# Patient Record
Sex: MALE | Race: WHITE
[De-identification: names, ages, dates, MRNs, and addresses within clinical notes are randomized per-mention and may not be internally consistent; named-entity substitution may affect disease eponyms.]

---

## 2018-03-13 ENCOUNTER — HOSPITAL ENCOUNTER (OUTPATIENT)
Dept: HOSPITAL 74 - JASU-SURG | Age: 78
Discharge: HOME | End: 2018-03-13
Payer: COMMERCIAL

## 2018-03-13 VITALS — HEART RATE: 63 BPM | SYSTOLIC BLOOD PRESSURE: 124 MMHG | DIASTOLIC BLOOD PRESSURE: 73 MMHG

## 2018-03-13 VITALS — TEMPERATURE: 97.8 F

## 2018-03-13 VITALS — BODY MASS INDEX: 31.3 KG/M2

## 2018-03-13 DIAGNOSIS — H26.9: Primary | ICD-10-CM

## 2018-03-13 PROCEDURE — 08RJ3JZ REPLACEMENT OF RIGHT LENS WITH SYNTHETIC SUBSTITUTE, PERCUTANEOUS APPROACH: ICD-10-PCS

## 2018-03-13 RX ADMIN — TROPICAMIDE SCH DROP: 10 SOLUTION/ DROPS OPHTHALMIC at 09:12

## 2018-03-13 RX ADMIN — PHENYLEPHRINE HYDROCHLORIDE SCH DROP: 0.25 SPRAY NASAL at 09:11

## 2018-03-13 RX ADMIN — MOXIFLOXACIN HYDROCHLORIDE SCH DROP: 5 SOLUTION/ DROPS OPHTHALMIC at 09:11

## 2018-03-13 RX ADMIN — TROPICAMIDE SCH DROP: 10 SOLUTION/ DROPS OPHTHALMIC at 09:04

## 2018-03-13 RX ADMIN — MOXIFLOXACIN HYDROCHLORIDE SCH DROP: 5 SOLUTION/ DROPS OPHTHALMIC at 09:20

## 2018-03-13 RX ADMIN — CYCLOPENTOLATE HYDROCHLORIDE SCH DROP: 10 SOLUTION/ DROPS OPHTHALMIC at 09:11

## 2018-03-13 RX ADMIN — CYCLOPENTOLATE HYDROCHLORIDE SCH DROP: 10 SOLUTION/ DROPS OPHTHALMIC at 09:03

## 2018-03-13 RX ADMIN — CYCLOPENTOLATE HYDROCHLORIDE SCH DROP: 10 SOLUTION/ DROPS OPHTHALMIC at 09:20

## 2018-03-13 RX ADMIN — PHENYLEPHRINE HYDROCHLORIDE SCH DROP: 0.25 SPRAY NASAL at 09:04

## 2018-03-13 RX ADMIN — TROPICAMIDE SCH DROP: 10 SOLUTION/ DROPS OPHTHALMIC at 09:20

## 2018-03-13 RX ADMIN — PHENYLEPHRINE HYDROCHLORIDE SCH DROP: 0.25 SPRAY NASAL at 09:20

## 2018-03-13 RX ADMIN — MOXIFLOXACIN HYDROCHLORIDE SCH DROP: 5 SOLUTION/ DROPS OPHTHALMIC at 09:04

## 2018-03-13 NOTE — OP
DATE OF OPERATION:  03/13/2018

 

SURGEON:  Pastor Mayer MD

 

PREOPERATIVE DIAGNOSIS:  Cataract, right eye.

 

OPERATION:  Phacoemulsification and intraocular lens implantation, right eye.

 

POSTOPERATIVE DIAGNOSIS:  Cataract, right eye.

 

ANESTHESIA:  Topical.

 

COMPLICATIONS:  None.

 

BLOOD LOSS:  None.

 

SPECIMEN:  None.

 

BRIEF HISTORY:  The patient is a 78-year-old man with a past medical history of

hypertension, who presented with decreased vision in the right eye down to 20/50- due

to a 1+ to 2+ nuclear sclerotic lens with inferior cortical changes.  After the

risks, benefits, and alternatives to cataract surgery were discussed with the

patient, he consented to surgery for the right eye.

 

DESCRIPTION OF PROCEDURE:  The patient was brought to the operating room and prepped

and draped in the usual sterile fashion, and an eyelid speculum was inserted in the

right eye.  A paracentesis was made, and the anterior chamber was inflated with

nonpreserved lidocaine.  This was followed by injection of Viscoat.  A groove was

made in the temporal clear cornea which was tunneled forward with a crescent blade. 

The anterior chamber was entered with a 2.75 keratome.  The cystotome was used to

make an incision in the center of the capsule, and a continuous curvilinear

capsulorrhexis was created.  The lens was hydrodissected until it was found to rotate

freely within the capsular bag.  Phacoemulsification was then used to remove the lens

in its entirety.  Irrigation and aspiration were used to remove residual cortical

material.  The anterior chamber and capsular bag were reinflated with Provisc, and a

23.0-diopter SN60WF AcrySof intraocular lens was injected into the capsular bag using

the Paauilo injector.  The lens was dialed into place using a Sinskey hook. 

Irrigation and aspiration were used to remove residual Viscoelastic.  The wound was

stromally hydrated until it was found to be watertight and the eye was at an

appropriate pressure.  The eyelid speculum was removed from the eye, and TobraDex

drops and a clear shield were placed over the right eye.  The patient was transferred

to the recovery room in stable condition and will follow up tomorrow.

 

 

SUJATHA DAVIS/[id number]

DD: 03/13/2018 12:01

DT: 03/13/2018 16:20

Job #:  [id number]

## 2018-03-13 NOTE — HP
History & Physical Update





- History


History: No Change





- Physical


Physical: No Change





- Assessment


Assessment: No Change





- Plan


Plan: No Change (Robbie)

## 2018-04-13 ENCOUNTER — HOSPITAL ENCOUNTER (OUTPATIENT)
Dept: HOSPITAL 74 - JASU-SURG | Age: 78
Discharge: HOME | End: 2018-04-13
Attending: UROLOGY
Payer: COMMERCIAL

## 2018-04-13 VITALS — DIASTOLIC BLOOD PRESSURE: 88 MMHG | HEART RATE: 68 BPM | TEMPERATURE: 97.8 F | SYSTOLIC BLOOD PRESSURE: 147 MMHG

## 2018-04-13 VITALS — BODY MASS INDEX: 31.3 KG/M2

## 2018-04-13 DIAGNOSIS — N40.0: Primary | ICD-10-CM

## 2018-04-13 DIAGNOSIS — N32.89: ICD-10-CM

## 2018-04-13 PROCEDURE — 0VT08ZZ RESECTION OF PROSTATE, VIA NATURAL OR ARTIFICIAL OPENING ENDOSCOPIC: ICD-10-PCS | Performed by: UROLOGY

## 2018-05-24 ENCOUNTER — HOSPITAL ENCOUNTER (INPATIENT)
Dept: HOSPITAL 74 - JER | Age: 78
LOS: 4 days | Discharge: HOME | DRG: 920 | End: 2018-05-28
Attending: INTERNAL MEDICINE | Admitting: INTERNAL MEDICINE
Payer: COMMERCIAL

## 2018-05-24 VITALS — BODY MASS INDEX: 33.4 KG/M2

## 2018-05-24 DIAGNOSIS — R31.9: ICD-10-CM
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
## 2018-05-24 DIAGNOSIS — I10: ICD-10-CM

## 2018-05-24 DIAGNOSIS — N17.9: ICD-10-CM

## 2018-05-24 DIAGNOSIS — N99.820: Primary | ICD-10-CM

## 2018-05-25 LAB
ALBUMIN SERPL-MCNC: 3 G/DL (ref 3.4–5)
ALBUMIN SERPL-MCNC: 3.3 G/DL (ref 3.4–5)
ALP SERPL-CCNC: 27 U/L (ref 45–117)
ALP SERPL-CCNC: 29 U/L (ref 45–117)
ALT SERPL-CCNC: 21 U/L (ref 12–78)
ALT SERPL-CCNC: 27 U/L (ref 12–78)
ANION GAP SERPL CALC-SCNC: 6 MMOL/L (ref 8–16)
ANION GAP SERPL CALC-SCNC: 9 MMOL/L (ref 8–16)
APPEARANCE UR: (no result)
APTT BLD: 23.5 SECONDS (ref 26.9–34.4)
AST SERPL-CCNC: 18 U/L (ref 15–37)
AST SERPL-CCNC: 19 U/L (ref 15–37)
BACTERIA #/AREA URNS HPF: (no result) /HPF
BASOPHILS # BLD: 0.4 % (ref 0–2)
BASOPHILS # BLD: 0.6 % (ref 0–2)
BILIRUB SERPL-MCNC: 1.3 MG/DL (ref 0.2–1)
BILIRUB SERPL-MCNC: 1.3 MG/DL (ref 0.2–1)
BILIRUB UR STRIP.AUTO-MCNC: NEGATIVE MG/DL
BUN SERPL-MCNC: 25 MG/DL (ref 7–18)
BUN SERPL-MCNC: 32 MG/DL (ref 7–18)
CALCIUM SERPL-MCNC: 8.3 MG/DL (ref 8.5–10.1)
CALCIUM SERPL-MCNC: 8.5 MG/DL (ref 8.5–10.1)
CHLORIDE SERPL-SCNC: 111 MMOL/L (ref 98–107)
CHLORIDE SERPL-SCNC: 111 MMOL/L (ref 98–107)
CO2 SERPL-SCNC: 24 MMOL/L (ref 21–32)
CO2 SERPL-SCNC: 26 MMOL/L (ref 21–32)
COLOR UR: (no result)
CREAT SERPL-MCNC: 1.1 MG/DL (ref 0.7–1.3)
CREAT SERPL-MCNC: 1.8 MG/DL (ref 0.7–1.3)
DEPRECATED RDW RBC AUTO: 13.9 % (ref 11.9–15.9)
DEPRECATED RDW RBC AUTO: 14.5 % (ref 11.9–15.9)
EOSINOPHIL # BLD: 0.3 % (ref 0–4.5)
EOSINOPHIL # BLD: 0.4 % (ref 0–4.5)
EPITH CASTS URNS QL MICRO: (no result) /HPF
GLUCOSE SERPL-MCNC: 102 MG/DL (ref 74–106)
GLUCOSE SERPL-MCNC: 181 MG/DL (ref 74–106)
HCT VFR BLD CALC: 37.5 % (ref 35.4–49)
HCT VFR BLD CALC: 41.4 % (ref 35.4–49)
HGB BLD-MCNC: 12.6 GM/DL (ref 11.7–16.9)
HGB BLD-MCNC: 13.7 GM/DL (ref 11.7–16.9)
INR BLD: 1.11 (ref 0.82–1.09)
KETONES UR QL STRIP: (no result)
LEUKOCYTE ESTERASE UR QL STRIP.AUTO: NEGATIVE
LYMPHOCYTES # BLD: 10.4 % (ref 8–40)
LYMPHOCYTES # BLD: 20.8 % (ref 8–40)
MAGNESIUM SERPL-MCNC: 2 MG/DL (ref 1.8–2.4)
MCH RBC QN AUTO: 28.2 PG (ref 25.7–33.7)
MCH RBC QN AUTO: 28.6 PG (ref 25.7–33.7)
MCHC RBC AUTO-ENTMCNC: 33.1 G/DL (ref 32–35.9)
MCHC RBC AUTO-ENTMCNC: 33.6 G/DL (ref 32–35.9)
MCV RBC: 85.2 FL (ref 80–96)
MCV RBC: 85.2 FL (ref 80–96)
MONOCYTES # BLD AUTO: 4.7 % (ref 3.8–10.2)
MONOCYTES # BLD AUTO: 8.5 % (ref 3.8–10.2)
NEUTROPHILS # BLD: 69.7 % (ref 42.8–82.8)
NEUTROPHILS # BLD: 84.2 % (ref 42.8–82.8)
NITRITE UR QL STRIP: NEGATIVE
PH UR: 7 [PH] (ref 5–8)
PLATELET # BLD AUTO: 145 K/MM3 (ref 134–434)
PLATELET # BLD AUTO: 169 K/MM3 (ref 134–434)
PMV BLD: 8.9 FL (ref 7.5–11.1)
PMV BLD: 9.3 FL (ref 7.5–11.1)
POTASSIUM SERPLBLD-SCNC: 4.2 MMOL/L (ref 3.5–5.1)
POTASSIUM SERPLBLD-SCNC: 4.6 MMOL/L (ref 3.5–5.1)
PROT SERPL-MCNC: 5.9 G/DL (ref 6.4–8.2)
PROT SERPL-MCNC: 6.5 G/DL (ref 6.4–8.2)
PROT UR QL STRIP: (no result)
PROT UR QL STRIP: (no result)
PT PNL PPP: 12.5 SEC (ref 9.7–13)
RBC # BLD AUTO: 4.41 M/MM3 (ref 4–5.6)
RBC # BLD AUTO: 4.86 M/MM3 (ref 4–5.6)
SODIUM SERPL-SCNC: 143 MMOL/L (ref 136–145)
SODIUM SERPL-SCNC: 144 MMOL/L (ref 136–145)
SP GR UR: 1.03 (ref 1–1.03)
UROBILINOGEN UR STRIP-MCNC: NEGATIVE MG/DL (ref 0.2–1)
WBC # BLD AUTO: 16 K/MM3 (ref 4–10)
WBC # BLD AUTO: 9.3 K/MM3 (ref 4–10)

## 2018-05-25 RX ADMIN — VALSARTAN SCH MG: 160 TABLET, FILM COATED ORAL at 11:11

## 2018-05-25 RX ADMIN — ASPIRIN 81 MG SCH MG: 81 TABLET ORAL at 11:11

## 2018-05-25 RX ADMIN — AMLODIPINE BESYLATE SCH MG: 10 TABLET ORAL at 11:11

## 2018-05-25 NOTE — PDOC
History of Present Illness





- General


History Source: Patient, EMS, Family, Old Records


Exam Limitations: No Limitations





- History of Present Illness


Initial Comments: 





05/25/18 00:42


The patient is a 78 year old male brought via EMS and presenting with his family

, with a significant past medical history of, who presents to the emergency 

department complaining of abdominal pain and hematuria onset today. He states 

that he felt pain on urination prior to the bleeding starting today and since 

has been passing clots. The wife states that the patient has been through 

multiple women pads. The patient notes that his prostate surgery was 1 month 

ago. He states that before the surgery he was using the bathroom 8-9 times 

every night and post surgery he is going to the bathroom 3-4 times every night. 

The patient denies any recent illness. Patient denies taking any blood thinners 

except for low dose aspirin. 





The patient denies chest pain, shortness of breath, headache or dizziness. 

Denies fever, chills, nausea, vomiting, diarrhea and constipation. 





Allergies: None 


Past surgical history: Appendectomy, prostate surgery 


Social History: No alcohol, tobacco or drug use reported 


Urology: Dr. Carmen Edge 








<Jhonatan Ibanez - Last Filed: 05/25/18 00:45>





<Ramona Sharma - Last Filed: 05/25/18 01:37>





- General


Chief Complaint: Hematuria


Stated Complaint: BLEEDING


Time Seen by Provider: 05/25/18 00:02





Past History





<Jhonatan Ibanez - Last Filed: 05/25/18 00:45>





- Past Medical History


Anemia: No


Asthma: No


Cancer: No


Cardiac Disorders: No


CVA: No


COPD: No


CHF: No


Dementia: No


Diabetes: No


GI Disorders: No


 Disorders: No


HTN: Yes


Hypercholesterolemia: No


Liver Disease: No


Seizures: No


Thyroid Disease: No





- Surgical History


Appendectomy: Yes





- Suicide/Smoking/Psychosocial Hx


Smoking History: Never smoked


Have you smoked in the past 12 months: No


Hx Alcohol Use: Yes (wine with dinner)


Drug/Substance Use Hx: No


Substance Use Type: Alcohol


Hx Substance Use Treatment: No





<Ramona Sharma - Last Filed: 05/25/18 01:37>





- Past Medical History


Allergies/Adverse Reactions: 


 Allergies











Allergy/AdvReac Type Severity Reaction Status Date / Time


 


No Known Drug Allergies Allergy   Verified 03/13/18 08:47











Home Medications: 


Ambulatory Orders





Amlodipine Besylate 10 mg PO DAILY 03/12/18 


Cholecalciferol (Vitamin D3) [Vitamin D3] 2,000 unit PO DAILY 03/12/18 


Metoprolol Succinate [Toprol Xl] 50 mg PO DAILY 03/12/18 


Rosuvastatin Calcium [Crestor] 5 mg PO DAILY 03/12/18 


Valsartan 320 mg PO DAILY 03/12/18 


Aspirin 81 mg PO DAILY 04/13/18 











**Review of Systems





- Review of Systems


Able to Perform ROS?: Yes


Comments:: 





05/25/18 00:42


GENERAL/CONSTITUTIONAL: No fever or chills. No weakness.


HEAD, EYES, EARS, NOSE AND THROAT: No change in vision. No ear pain or 

discharge. No sore throat.


GASTROINTESTINAL: No nausea, vomiting, diarrhea or constipation.


GENITOURINARY: (+) Heavy Hematuria. 


CARDIOVASCULAR: No chest pain or shortness of breath.


RESPIRATORY: No cough, wheezing, or hemoptysis.


MUSCULOSKELETAL: No joint or muscle swelling or pain. No neck or back pain.


SKIN: No rash


NEUROLOGIC: No headache, vertigo, loss of consciousness, or change in strength/

sensation.


ENDOCRINE: No increased thirst. No abnormal weight change.


HEMATOLOGIC/LYMPHATIC: No anemia, easy bleeding, or history of blood clots.


ALLERGIC/IMMUNOLOGIC: No hives or skin allergy.








<Jhonatan Ibanez - Last Filed: 05/25/18 00:45>





*Physical Exam





- Vital Signs


 Last Vital Signs











Temp Pulse Resp BP Pulse Ox


 


 97.5 F L  87   18   108/69   100 


 


 05/24/18 23:30  05/24/18 23:30  05/24/18 23:30  05/24/18 23:30  05/24/18 23:30














- Physical Exam


Comments: 





05/25/18 00:42


Constitutional: Awake, alert, oriented.  No acute distress.


Head:  Normocephalic.  Atraumatic


Eyes:  PERRL. EOMI.  Conjunctivae are not pale.


ENT:  Mucous membranes are moist and intact. Posterior pharynx without exudates 

or erythema. Uvula midline.


Neck:  Supple.  Full ROM. No lymphadenopathy.


Cardiovascular:  Regular rate.  Regular rhythm. S1, S2 regular.  Distal pulses 

are 2+ and symmetric.  


Pulmonary/Chest:  No evidence of respiratory distress.  Clear to auscultation 

bilaterally  No wheezing, rales or rhonchi.


Abdominal:  (+) Distended abdomen. Soft. There is no tenderness.  No rebound, 

guarding or rigidity.  No organomegaly. No palpable masses. Good bowel sounds.


Back:  No CVA tenderness.


Musculoskeletal:  No edema.  No cyanosis.  No clubbing.  Full range of motion 

in all extremities.  Nocalf tenderness. Radial/pedal pulses are intact and 2+ 

bilaterally


Skin:  Skin is warm and dry.  No petechiae.  No purpura.  


Neurological:  Alert and oriented to person, place, and time.  Cranial nerves II

-XII are grossly intact. Normal speech. Strength is grossly symmetric. No 

sensory deficits.


Psychiatric:  Good eye contact.  Normal interaction, affect and behavior.


Penile Exam: (+) Actively mild oozing. 











<Jhonatan Ibanez - Last Filed: 05/25/18 00:45>





ED Treatment Course





- LABORATORY


CBC & Chemistry Diagram: 


 05/25/18 00:43





 05/25/18 00:43





<Ramona Sharma - Last Filed: 05/25/18 01:37>





Medical Decision Making





- Medical Decision Making





05/25/18 00:17


a/p: 77yo male with hematuria that started this afternoon


-bleeding from the meatus of the penis


-distended bladder


-recent TURP by Dr. Edge at the end of april


-pt with lower abd pain, hematuria, dysuria, urinary freq


-will obtain labs


-call placed to Dr. Edge


-pt currently urinating


-will most likely need admission and CBI - will discuss placing 3way reyes for 

cbi with Dr. Edge


05/25/18 00:32


case discussed with Dr. Edge from Urology - recommends us placing 3way 22F 

reyes and starting CBI


-will see patient in consult in the AM


05/25/18 00:34


PMD is Dr. Avalos - call placed


05/25/18 00:38


case discussed with Dr. Avalos who accepts pt to service


05/25/18 00:46


poor inspiratory effort on cxr


05/25/18 01:30


reyes in place draining 700cc of bloody urine. no clots


pt feeling better with reyes in place


CBI starting


elevated wbc


ua pending


cmp pending


will most likely need abx


will start rocephin





<Ramona Sharma - Last Filed: 05/25/18 01:37>





*DC/Admit/Observation/Transfer





- Attestations


Scribe Attestion: 





05/25/18 00:42





Documentation prepared by Jhonatan Ibanez, acting as medical scribe for 

Ramona Sharma DO





<Jhonatan Ibanez - Last Filed: 05/25/18 00:45>





- Discharge Dispostion


Decision to Admit order: Yes





- Attestations


Physician Attestion: 





05/25/18 01:33








I, Dr. Ramona Sharma, DO, attest that this document has been prepared under 

my direction and personally reviewed by me in its entirety.   I further attest, 

that it accurately reflects all work, treatment, procedures and medical decision

-making performed by me.  





<Ramona Sharma - Last Filed: 05/25/18 01:37>


Diagnosis at time of Disposition: 


 Hematuria, VLAD (acute kidney injury)








- Discharge Dispostion


Condition at time of disposition: Guarded

## 2018-05-25 NOTE — EKG
Test Reason : 

Blood Pressure : ***/*** mmHG

Vent. Rate : 064 BPM     Atrial Rate : 064 BPM

   P-R Int : 182 ms          QRS Dur : 090 ms

    QT Int : 466 ms       P-R-T Axes : 025 -06 083 degrees

   QTc Int : 480 ms

 

NORMAL SINUS RHYTHM

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

T WAVE ABNORMALITY, CONSIDER LATERAL ISCHEMIA

ABNORMAL ECG

WHEN COMPARED WITH ECG OF 26-JUL-2007 08:26,

ST NO LONGER ELEVATED IN ANTERIOR LEADS

T WAVE INVERSION NOW EVIDENT IN ANTEROLATERAL LEADS

Confirmed by SHERRY CROCKETT, SARAH ([id number]) on 5/25/2018 10:42:18 AM

 

Referred By:             Confirmed By:SARAH POWELL MD

## 2018-05-25 NOTE — HP
DATE OF ADMISSION:

 

DATE OF DICTATION:  05/25/2018

 

HISTORY:  This is a 78-year-old male known to me for many years diagnosed to have

hypertension on medication.  Recently he had prostate surgery, TURP for BPH. 

Yesterday he had hematuria and had difficulty in passing urine so he was brought to

the emergency room by family.  Dr. Carmen Edge is his urologist who was taking care

of him.  In the ER, a Rangel was inserted, and continuous bladder irrigation was put. 

After that, he got pain relief.  This morning he is comfortable, but Rangel is port

with irrigation.

 

PHYSICAL EXAMINATION: 

Vital Signs:  /80, pulse 62, respirations 20, temperature 98.

HEENT:  Unremarkable.

Neck:  Supple.  No JVD.

Lungs:  Clear.

Heart:  S1, S2 normal.  No S3 or S4.

Abdomen:  Protuberant.

Genitourinary:  Rangel in place.  No gross hematuria.

Extremities:  No edema.

Neurologic:  Grossly normal.

 

LABORATORY REPORT:  WBC 16, hemoglobin 13.7, hematocrit 41.4.  Chemistry:  Sodium

144, potassium 4.6, chloride 111, BUN 32, creatinine 1.8.

 

DIAGNOSES: 

1.  Hematuria.

2.  Benign prostatic hypertrophy.

3.  Hypertension.

 

PLAN:  Continue home medications and bladder irrigation.  Urology consult Dr. Carmen Edge.  We will follow.

 

 

 

EDWARD MCKENZIE M.D.

 

[id number]

DD: 05/25/2018 09:50

DT: 05/25/2018 10:23

Job #:  [id number]

## 2018-05-26 RX ADMIN — AMLODIPINE BESYLATE SCH MG: 10 TABLET ORAL at 09:00

## 2018-05-26 RX ADMIN — VALSARTAN SCH MG: 160 TABLET, FILM COATED ORAL at 09:00

## 2018-05-26 RX ADMIN — ASPIRIN 81 MG SCH MG: 81 TABLET ORAL at 09:00

## 2018-05-26 NOTE — PN
Progress Note, Physician


History of Present Illness: 





Admitted with hematuria on CBI





- Current Medication List


Current Medications: 


Active Medications





Amlodipine Besylate (Norvasc -)  10 mg PO DAILY [facility]


   Last Admin: 05/26/18 09:00 Dose:  10 mg


Aspirin (Asa -)  81 mg PO DAILY [facility]


   Last Admin: 05/26/18 09:00 Dose:  81 mg


Metoprolol Succinate (Toprol Xl -)  50 mg PO DAILY [facility]


   Last Admin: 05/26/18 09:00 Dose:  50 mg


Valsartan (Diovan -)  320 mg PO DAILY [facility]


   Last Admin: 05/26/18 09:00 Dose:  320 mg











- Objective


Vital Signs: 


 Vital Signs











Temperature  98.7 F   05/26/18 07:30


 


Pulse Rate  68   05/26/18 07:30


 


Respiratory Rate  16   05/26/18 07:30


 


Blood Pressure  143/82   05/26/18 07:30


 


O2 Sat by Pulse Oximetry (%)  99   05/25/18 22:00











Constitutional: Yes: No Distress


Eyes: Yes: WNL


HENT: Yes: WNL


Neck: Yes: WNL


Cardiovascular: Yes: WNL


Respiratory: Yes: WNL


Gastrointestinal: Yes: WNL


...Rectal Exam: Yes: WNL


Genitourinary: Yes: Rangel Present, Hematuria


Breast(s): Yes: WNL


Edema: No


Neurological: Yes: Alert


Labs: 


 CBC, BMP





 05/25/18 09:19 





 05/25/18 09:19 





 INR, PTT











INR  1.11  (0.82-1.09)   05/25/18  00:43    














Assessment/Plan





Continue CBI


Urology consult

## 2018-05-26 NOTE — CONS
DATE OF CONSULTATION:

 

DATE OF DICTATION:  05/26/2018

 

Patient is a 78-year-old male admitted via the emergency room with gross, total,

painless hematuria and clot retention.  Patient recently underwent a resection of his

prostate gland approximately 1 month ago for benign prostatic hypertrophy.  He states

that he was passing urine without any problem for the past several weeks.  Early this

morning, he developed hematuria.  This became grossly bloody, and patient started

developing suprapubic pain and dribbling.  In the emergency room, he was found to be

in clot retention.  A regular Rangel catheter was placed and grossly bloody urine was

irrigated out.  His vital signs in the emergency room were 150/80, respirations 20,

temperature 98.  His white count was 16,000; hemoglobin was 13.7 and hematocrit 41.7.

 The patient's BUN was 32 and creatinine 1.8.  The patient is admitted to the

hospital with gross, total, painless hematuria.  He also has history of high blood

pressure.  When patient went to the floor, a 3-way Rangel catheter was inserted, and

continuous bladder irrigation with normal saline was performed.  The bladder was

irrigated, and approximately 300 mL of clots was drained.

 

Presently, the patient feels better.  His abdomen is soft.  There is no CVA

tenderness.  Genitalia are atraumatic.  The Rangel catheter with bladder irrigation is

crystal clear.

 

IMPRESSION:  At present is post transurethral resection of the prostate bleeding.

 

PLAN:  We will discontinue CBI in a.m.  If urine remains clear, patient can go home

with Rangel attached to a leg bag.  We will follow the patient in the office on

Wednesday, May 30, to remove the Rangel catheter.  We will keep you informed.

 

 

SUJATHA [id number]

DD: 05/26/2018 12:38

DT: 05/26/2018 12:51

Job #:  [id number]

## 2018-05-27 RX ADMIN — ASPIRIN 81 MG SCH MG: 81 TABLET ORAL at 09:27

## 2018-05-27 RX ADMIN — VALSARTAN SCH MG: 160 TABLET, FILM COATED ORAL at 09:27

## 2018-05-27 RX ADMIN — AMLODIPINE BESYLATE SCH MG: 10 TABLET ORAL at 09:27

## 2018-05-27 NOTE — PN
Progress Note, Physician


Chief Complaint: 





Feels better


History of Present Illness: 





CBI done,has reyes urine clear





- Current Medication List


Current Medications: 


Active Medications





Amlodipine Besylate (Norvasc -)  10 mg PO DAILY [facility]


   Last Admin: 05/27/18 09:27 Dose:  10 mg


Aspirin (Asa -)  81 mg PO DAILY [facility]


   Last Admin: 05/27/18 09:27 Dose:  81 mg


Metoprolol Succinate (Toprol Xl -)  50 mg PO DAILY [facility]


   Last Admin: 05/27/18 09:27 Dose:  50 mg


Valsartan (Diovan -)  320 mg PO DAILY [facility]


   Last Admin: 05/27/18 09:27 Dose:  320 mg











- Objective


Vital Signs: 


 Vital Signs











Temperature  98.9 F   05/27/18 09:00


 


Pulse Rate  77   05/27/18 09:00


 


Respiratory Rate  17   05/27/18 09:00


 


Blood Pressure  112/78   05/27/18 09:00


 


O2 Sat by Pulse Oximetry (%)  94 L  05/27/18 09:00











Constitutional: Yes: No Distress


Eyes: Yes: WNL


HENT: Yes: WNL


Neck: Yes: WNL


Cardiovascular: Yes: WNL


Respiratory: Yes: WNL


Gastrointestinal: Yes: WNL


...Rectal Exam: Yes: Deferred


Genitourinary: Yes: Reyes Present


Edema: No


Neurological: Yes: Alert


...Motor Strength: WNL


Labs: 


 CBC, BMP





 05/25/18 09:19 





 05/25/18 09:19 





 INR, PTT











INR  1.11  (0.82-1.09)   05/25/18  00:43    














Assessment/Plan





Continue same trt

## 2018-05-28 VITALS — TEMPERATURE: 98.2 F | SYSTOLIC BLOOD PRESSURE: 127 MMHG | DIASTOLIC BLOOD PRESSURE: 93 MMHG | HEART RATE: 64 BPM

## 2018-05-28 RX ADMIN — VALSARTAN SCH MG: 160 TABLET, FILM COATED ORAL at 09:15

## 2018-05-28 RX ADMIN — ASPIRIN 81 MG SCH MG: 81 TABLET ORAL at 09:15

## 2018-05-28 RX ADMIN — AMLODIPINE BESYLATE SCH MG: 10 TABLET ORAL at 09:15

## 2018-05-28 NOTE — DS
Physical Examination


Vital Signs: 


 Vital Signs











Temperature  98.2 F   05/28/18 08:13


 


Pulse Rate  64   05/28/18 08:13


 


Respiratory Rate  16   05/28/18 08:13


 


Blood Pressure  127/93   05/28/18 08:13


 


O2 Sat by Pulse Oximetry (%)  96   05/28/18 09:00











Findings/Remarks: 





Admitted with urethral bleeding


CBI was done ,bleeding stopped ,going with reyes and leg bag


Constitutional: Yes: No Distress


Eyes: Yes: WNL


HENT: Yes: WNL


Neck: Yes: WNL


Cardiovascular: Yes: WNL


Respiratory: Yes: WNL


Gastrointestinal: Yes: WNL


...Rectal Exam: Yes: Deferred


Renal/: Yes: Reyes Present


Neurological: Yes: Alert


...Motor Strength: WNL


Psychiatric: Yes: Alert


Labs: 


 CBC, BMP





 05/25/18 09:19 





 05/25/18 09:19 











Discharge Summary


Reason For Visit: HEMATURIA ACUTE KIDNEY INJURY


Current Active Problems





VLAD (acute kidney injury) (Acute)


Hematuria (Acute)








Condition: Guarded





- Instructions





- Home Medications


Comprehensive Discharge Medication List: 


Ambulatory Orders





Amlodipine Besylate 10 mg PO DAILY 03/12/18 


Cholecalciferol (Vitamin D3) [Vitamin D3] 2,000 unit PO DAILY 03/12/18 


Metoprolol Succinate [Toprol Xl] 50 mg PO DAILY 03/12/18 


Rosuvastatin Calcium [Crestor] 5 mg PO DAILY 03/12/18 


Valsartan 320 mg PO DAILY 03/12/18 


Aspirin 81 mg PO DAILY 04/13/18

## 2020-02-15 ENCOUNTER — HOSPITAL ENCOUNTER (INPATIENT)
Dept: HOSPITAL 74 - JER | Age: 80
LOS: 2 days | Discharge: HOME | DRG: 309 | End: 2020-02-17
Attending: INTERNAL MEDICINE | Admitting: INTERNAL MEDICINE
Payer: COMMERCIAL

## 2020-02-15 VITALS — BODY MASS INDEX: 31.8 KG/M2

## 2020-02-15 DIAGNOSIS — I25.84: ICD-10-CM

## 2020-02-15 DIAGNOSIS — I25.10: ICD-10-CM

## 2020-02-15 DIAGNOSIS — N40.1: ICD-10-CM

## 2020-02-15 DIAGNOSIS — I48.0: Primary | ICD-10-CM

## 2020-02-15 DIAGNOSIS — E78.00: ICD-10-CM

## 2020-02-15 DIAGNOSIS — R60.9: ICD-10-CM

## 2020-02-15 DIAGNOSIS — M19.90: ICD-10-CM

## 2020-02-15 DIAGNOSIS — Z86.79: ICD-10-CM

## 2020-02-15 DIAGNOSIS — I11.9: ICD-10-CM

## 2020-02-15 DIAGNOSIS — I50.1: ICD-10-CM

## 2020-02-15 LAB
ALBUMIN SERPL-MCNC: 3.5 G/DL (ref 3.4–5)
ALP SERPL-CCNC: 37 U/L (ref 45–117)
ALT SERPL-CCNC: 27 U/L (ref 13–61)
ANION GAP SERPL CALC-SCNC: 5 MMOL/L (ref 8–16)
APTT BLD: 35.3 SECONDS (ref 25.2–36.5)
AST SERPL-CCNC: 25 U/L (ref 15–37)
BASOPHILS # BLD: 0.9 % (ref 0–2)
BILIRUB SERPL-MCNC: 1.4 MG/DL (ref 0.2–1)
BNP SERPL-MCNC: 413.8 PG/ML (ref 5–450)
BUN SERPL-MCNC: 15 MG/DL (ref 7–18)
CALCIUM SERPL-MCNC: 9.6 MG/DL (ref 8.5–10.1)
CHLORIDE SERPL-SCNC: 108 MMOL/L (ref 98–107)
CO2 SERPL-SCNC: 29 MMOL/L (ref 21–32)
CREAT SERPL-MCNC: 1 MG/DL (ref 0.55–1.3)
DEPRECATED RDW RBC AUTO: 14.2 % (ref 11.9–15.9)
EOSINOPHIL # BLD: 2.1 % (ref 0–4.5)
GLUCOSE SERPL-MCNC: 102 MG/DL (ref 74–106)
HCT VFR BLD CALC: 54.4 % (ref 35.4–49)
HGB BLD-MCNC: 18.7 GM/DL (ref 11.7–16.9)
INR BLD: 0.98 (ref 0.83–1.09)
LYMPHOCYTES # BLD: 20 % (ref 8–40)
MCH RBC QN AUTO: 30.4 PG (ref 25.7–33.7)
MCHC RBC AUTO-ENTMCNC: 34.4 G/DL (ref 32–35.9)
MCV RBC: 88.3 FL (ref 80–96)
MONOCYTES # BLD AUTO: 9 % (ref 3.8–10.2)
NEUTROPHILS # BLD: 68 % (ref 42.8–82.8)
PLATELET # BLD AUTO: 180 K/MM3 (ref 134–434)
PMV BLD: 8.9 FL (ref 7.5–11.1)
POTASSIUM SERPLBLD-SCNC: 4.6 MMOL/L (ref 3.5–5.1)
PROT SERPL-MCNC: 7.4 G/DL (ref 6.4–8.2)
PT PNL PPP: 11.6 SEC (ref 9.7–13)
RBC # BLD AUTO: 6.16 M/MM3 (ref 4–5.6)
SODIUM SERPL-SCNC: 141 MMOL/L (ref 136–145)
WBC # BLD AUTO: 10.5 K/MM3 (ref 4–10)

## 2020-02-15 NOTE — PDOC
History of Present Illness





- General


Chief Complaint: Abnormal Lab Results (Outside)


Stated Complaint: SENT BY PCP


Time Seen by Provider: 02/15/20 11:59





- History of Present Illness


Initial Comments: 


HPI: 


78yo M with PMH of HTN, HLD, BPH sent by his primary care physician for new-

onset afib. Patient states he feels he is at his baseline and has no acute 

complaints. Saw his doctor today for pre-op clearance prior to a cataract 

surgery on March 2 and a EKG was performed which showed afib for the first 

time. Patient last saw a cardiologist about ten years ago in which a stress 

test and ECHO were performed with benign results. No fevers, chills, chest pain

, or shortness of breath. 





PCP: Dr. Brett Avalos ([phone])


Cardio: Dr. Dimitri Finley 





ROS:


Constitutional: no fever, no chills


HEENT: no throat pain, no dysphagia


Cardiovascular: no chest pain, no palpitations


Respiratory: no cough, no shortness of breath


Gastrointestinal: no abdominal pain, no nausea


Musculoskeletal: no myalgia, no arthralgia


Skin: no rash, no itching


Neurologic: no headache, no weakness


Psych: no agitation, no anxiety





PE: 


General: Awake, alert, and fully oriented, in no acute distress


Head: No signs of trauma


Eyes: EOMI, sclera anicteric


ENT: Moist mucus membranes


Neck: Normal ROM, supple


Lungs: Lungs clear, Normal breath sounds


Cardio: Tachycardic, Irregular rhythm, S1 and S2 present


Abdomen: Soft, nontender. No guarding, no rebound, no masses


Extremities: Normal range of motion, Distal pulses present, No calf tenderness, 

2+ pedal edema in BLE


SKIN: Warm, Dry, normal turgor


Neurologic: Cranial nerves II through XII grossly intact. Normal speech





ED Course/MDM: 


DDX including but not limited to new onsit afib, CHF, metabolic derangement, 

anemia


Labs, EKG, CXR





Per paperwork from PCP clinic: 


3/16/19 EKG: sinus bradycardia, incomplete RBBB





Note from Dr. Dimitri Finley dated 10/1/2010: "The patient had a nuclear stress 

testing which was submaximal with achievement of 79% of maximum predicted 

target heart rate. the patient exercised for seven minutes and twenty-nine 

seconds. No EKG changes were seen. Nuclear result showed small inferior fixed 

defect from base to mid-cavity compatible with diaphragmeatic attenuation with 

normal LV ejection fraction of 84%. Echocardiogram was unremarkable with trace 

tricuspid regurgitation, otherwise normal LV systolic function."





EKG: rate 117, QTc 410, Afib with RVR


02/15/20 12:19





Patient is 3 points on [id number]


Stroke risk was 3.2% per year in >90,000 patients (the [place] Atrial 

Fibrillation Cohort Study) and 4.6% risk of stroke/TIA/systemic embolism.


One recommendation suggests a 0 score is low risk and may not require 

anticoagulation; a 1 score is low-moderate risk and should consider 

antiplatelet or anticoagulation, and score 2 or greater is moderate-high risk 

and should otherwise be an anticoagulation candidate.


02/15/20 12:23








 CBC











WBC  10.5 K/mm3 (4.0-10.0)  H  02/15/20  13:03    


 


RBC  6.16 M/mm3 (4.00-5.60)  H  02/15/20  13:03    


 


Hgb  18.7 GM/dL (11.7-16.9)  H  02/15/20  13:03    


 


Hct  54.4 % (35.4-49)  H D 02/15/20  13:03    


 


MCV  88.3 fl (80-96)   02/15/20  13:03    


 


MCH  30.4 pg (25.7-33.7)   02/15/20  13:03    


 


MCHC  34.4 g/dl (32.0-35.9)   02/15/20  13:03    


 


RDW  14.2 % (11.9-15.9)   02/15/20  13:03    


 


Plt Count  180 K/MM3 (134-434)  D 02/15/20  13:03    


 


MPV  8.9 fl (7.5-11.1)   02/15/20  13:03    


 


Absolute Neuts (auto)  7.1 K/mm3 (1.5-8.0)   02/15/20  13:03    


 


Neutrophils %  68.0 % (42.8-82.8)   02/15/20  13:03    


 


Lymphocytes %  20.0 % (8-40)   02/15/20  13:03    


 


Monocytes %  9.0 % (3.8-10.2)   02/15/20  13:03    


 


Eosinophils %  2.1 % (0-4.5)  D 02/15/20  13:03    


 


Basophils %  0.9 % (0-2.0)   02/15/20  13:03    


 


Nucleated RBC %  0 % (0-0)   02/15/20  13:03    








Mild leukocytosis





 CMP











Sodium  141 mmol/L (136-145)   02/15/20  13:03    


 


Potassium  4.6 mmol/L (3.5-5.1)   02/15/20  13:03    


 


Chloride  108 mmol/L ()  H  02/15/20  13:03    


 


Carbon Dioxide  29 mmol/L (21-32)   02/15/20  13:03    


 


Anion Gap  5 MMOL/L (8-16)  L  02/15/20  13:03    


 


BUN  15.0 mg/dL (7-18)   02/15/20  13:03    


 


Creatinine  1.0 mg/dL (0.55-1.3)   02/15/20  13:03    


 


Est GFR (CKD-EPI)AfAm  83.77   02/15/20  13:03    


 


Est GFR (CKD-EPI)NonAf  72.28   02/15/20  13:03    


 


Random Glucose  102 mg/dL ()   02/15/20  13:03    


 


Calcium  9.6 mg/dL (8.5-10.1)   02/15/20  13:03    


 


Total Bilirubin  1.4 mg/dL (0.2-1)  H  02/15/20  13:03    


 


AST  25 U/L (15-37)   02/15/20  13:03    


 


ALT  27 U/L (13-61)   02/15/20  13:03    


 


Alkaline Phosphatase  37 U/L ()  L  02/15/20  13:03    


 


Troponin I  < 0.02 ng/ml (0.00-0.05)   02/15/20  13:03    


 


B-Natriuretic Peptide  413.8 pg/ml (5-450)   02/15/20  13:03    


 


Total Protein  7.4 g/dl (6.4-8.2)   02/15/20  13:03    


 


Albumin  3.5 g/dl (3.4-5.0)   02/15/20  13:03    








Electrolytes unremarkable


Cr normal


Tpn undetectable


BNP normal





CXR as reported by radiology: "ACCESSION #: [id number] EXAM#: TYPE/EXAM: 

RESULT: [id number] RAD/CHEST PA LAT Chest: New onset atrial fibrillation 2 views 

of the chest have been submitted. There is partial compression of the lower 

thoracic vertebral body, sharp angles, sclerotic knob, normal carlos and normal 

heart. There is slight prominence of the left hilar markings. There is no sign 

of infiltrate or failure. The soft tissues are intact. Since 10/14/2011 is 

slightly more tortuosity of the thoracic aorta but the prominent left hilum and 

partially compressed vertebral body appear unchanged. The lungs remain clear. 

Correlation recommended. Reported By: Salvatore Kim MD 02/15/20 1350 "


02/15/20 13:54





Discussed case with Dr. Avalos. He requested that I call Dr. Finley, the patient's 

cardiologist. I explained that per ED policy, we no longer make non-emergency 

calls to consultants. Dr. Avalos requested that I call Dr. Finley to ask whether or 

not the patient should be admitted. In the event that admission is recommended, 

I can just go ahead and place the admission order and do not have to call back 

Dr. Avalos.


02/15/20 13:57





Discussed case with Dr. Bonilla, cardiology. He does not necessarily recommend 

admission for the patient as patient is currently rate-controlled. He 

recommends 5mg eliquis for anticoagulation were patient to be discharged. 

Patient is already on 100mg atenolol. 


We will discuss these recommendations with Dr. Avalos as patient's insurance may 

not cover eliquis


02/15/20 14:11





Dr. Darby discussed case with Dr. Avalos. Decision made to admit patient due 

to his age and EKG changes. 


324 ASA ordered


02/15/20 14:46











Past History





- Past Medical History


Allergies/Adverse Reactions: 


 Allergies











Allergy/AdvReac Type Severity Reaction Status Date / Time


 


No Known Drug Allergies Allergy   Verified 02/15/20 11:21











Home Medications: 


Ambulatory Orders





Cholecalciferol (Vitamin D3) [Vitamin D3] 2,000 unit PO DAILY 03/12/18 


Rosuvastatin Calcium [Crestor] 5 mg PO DAILY 03/12/18 


Amlodipine Besylate [Norvasc -] 10 mg PO DAILY  tablet 05/28/18 


Metoprolol Succinate [Toprol XL -] 50 mg PO DAILY  tab.sr.24h 05/28/18 


Valsartan [Diovan] 320 mg PO DAILY  tablet 05/28/18 








Anemia: No


Asthma: No


Cancer: No


Cardiac Disorders: No


CVA: No


COPD: No


CHF: No


Dementia: No


Diabetes: No


GI Disorders: No


 Disorders: No


HTN: Yes


Hypercholesterolemia: No


Liver Disease: No


Seizures: No


Thyroid Disease: No





- Surgical History


Appendectomy: Yes





- Psycho Social/Smoking Cessation Hx


Smoking History: Never smoked


Have you smoked in the past 12 months: No


Hx Alcohol Use: No


Drug/Substance Use Hx: No


Substance Use Type: Alcohol


Hx Substance Use Treatment: No





*Physical Exam





- Vital Signs


 Last Vital Signs











Temp Pulse Resp BP Pulse Ox


 


 98.0 F   73   18   141/110 H  96 


 


 02/15/20 11:28  02/15/20 11:28  02/15/20 11:28  02/15/20 11:28  02/15/20 11:28














ED Treatment Course





- LABORATORY


CBC & Chemistry Diagram: 


 02/15/20 13:03





 02/15/20 13:03





- RADIOLOGY


Radiology Studies Ordered: 














 Category Date Time Status


 


 CHEST PA & LAT [RAD] Stat Radiology  02/15/20 12:16 Ordered














Discharge





- Discharge Information


Problems reviewed: Yes


Clinical Impression/Diagnosis: 


 New onset a-fib





Condition: Guarded





- Admission


Yes





- Follow up/Referral





- Patient Discharge Instructions





- Post Discharge Activity

## 2020-02-15 NOTE — PDOC
Documentation entered by Salima Lilly SCRIBE, acting as scribe for 

Sandra Darby MD.








Sandra Darby MD:  This documentation has been prepared by the Maxx henderson Nirvannie, SCRIBE, under my direction and personally reviewed by me in 

its entirety.  I confirm that the documentation accurately reflects all work, 

treatment, procedures, and medical decision making performed by me.  





Attending Attestation





- Resident


Resident Name: Zully FrancisHillary





- ED Attending Attestation


I have performed the following: I have examined & evaluated the patient, The 

case was reviewed & discussed with the resident, I agree w/resident's findings 

& plan, Exceptions are as noted





- HPI


HPI: 





02/15/20 12:37


The patient is a 79 year old male, with a significant past medical history of 

HTN, HLD, BPH, and OA who presents to the emergency department with abnormal 

EKG findings. As per Dr. Avalos, patient presented at his office for medical 

clearance for an upcoming cataract surgery at which time he was found to be in 

a rhythm of atrial fibrillation with rapid ventricular response incomplete 

right bundle branch block and inferior infarct, age undetermined. Patients 

last EKG done at Dr. Cortes office on 3/16/19 depicted sinus bradycardia with 

an incomplete right bundle branch block and inferior infarct, age undetermined. 

While in the ED, patient notes to be asymptomatic and at his baseline.





He denies any recent fevers, chills, headache or dizziness. He denies any 

recent nausea, vomit, diarrhea or constipation. He denies any recent chest pain 

or shortness of breath. He denies any recent dysuria, frequency, urgency or 

hematuria.





Allergies: NKA


Past surgical history: Appendectomy, prostate surgery 


Social History: Nonsmoker. Denies EtOH use and recreational drug use. 


Primary Care Physician: Dr. Brett Avalos


Cardiologist: Dr. Dimitri Finley


EF in 2010:  84%








- Physicial Exam


PE: 





02/15/20 14:31


awake alert nad lungs clear bilat heart irreg reg, no mrg abd soft nt nd ext 

wwp mild 1+ pitting edema. bilat. normal resp effort. 





- Medical Decision Making





02/15/20 14:32


80 yo M h/o htn here with c/o new onset afib found in preop testing. plan lab 

ekg trop . concern for secondary failure due to new afib. with bilat leg edema.


in addition ekg with Q wave III, AVF.  


cxr no edema. labs unremarkable. trop negative will admit to telemetry for rate 

control, anticoagulation, and new ekg changes.


d/w dr avalos. requesting consult to dr finley. 





**Heart Score/ECG Review


  ** #1


General ECG Interpretation: Sinus Rhythm (117), Normal Rate, Normal Intervals


Compared to previous ECG there are: Other (q wave III, AVF. changed frm prior 

EKG 2018, unchanged from ekg 2011.)

## 2020-02-16 RX ADMIN — VALSARTAN SCH MG: 160 TABLET, FILM COATED ORAL at 09:25

## 2020-02-16 RX ADMIN — APIXABAN SCH MG: 5 TABLET, FILM COATED ORAL at 21:37

## 2020-02-16 RX ADMIN — HYDROCHLOROTHIAZIDE SCH MG: 25 TABLET ORAL at 11:13

## 2020-02-16 RX ADMIN — APIXABAN SCH MG: 5 TABLET, FILM COATED ORAL at 11:12

## 2020-02-16 RX ADMIN — AMLODIPINE BESYLATE SCH MG: 10 TABLET ORAL at 09:25

## 2020-02-16 RX ADMIN — VITAMIN D, TAB 1000IU (100/BT) SCH UNIT: 25 TAB at 09:25

## 2020-02-16 NOTE — HP
DATE OF ADMISSION:  02/15/2020

 

This is a 79-year-old male, known to me for many years, came to my office yesterday. 

He needed clearance for cataract surgery.  When I did an EKG, I saw new-onset atrial

fibrillation, so was sent to the emergency room.  He denies any chest pain, not short

of breath.  Evaluated by his cardiologist, Dr. Bonilla, advised hospitalization to

start on IV heparin and then start Eliquis 5 mg b.i.d. This morning, the patient is

asymptomatic, no complaints.  

 

PHYSICAL EXAMINATION TODAY: 

Vital Signs:  /80, pulse 72, respiration 20, temperature 88.  When he came in,

he was tachycardic yesterday, pulse reaching 130.

HEENT:  Unremarkable.

Neck:  Supple, no JVD.

Lungs:  Clear.

Heart:  S1, S2 normal.  No S3, S4.

Abdomen:  Soft.

Legs:  No edema.

Neurological:  Grossly normal.

 

CHEST X-RAY:  Lungs are clear.

 

LABORATORY DATA:  WBC 10.5, hemoglobin 18.7.  Coagulation:  INR is 0.98.  Chemistry: 

Sodium 141, potassium 4.6, chloride 108, BUN 15, creatinine 1, AST/ALT normal. 

B-peptide 413.  Total albumin 3.7, total protein 7.4. 

 

IMPRESSION:  Acute onset atrial fibrillation essentially, hypertension, new-onset

atrial fibrillation.  Will follow.

 

 

 

SUJATHA [id number]

DD: 02/16/2020 15:36

DT: 02/16/2020 18:10

Job #:  [id number]

## 2020-02-16 NOTE — PN
Progress Note (short form)





- Note


Progress Note: 


Chief Complaint: Events noted, notes reviewed, symptomatic new onset atrial 

fibrillation, denies any palpitations, denies any chest pain or dyspnea





History of Present Illness: 


Seen and examined on telemetry. Full consult dictated





Medications: 





 


 Current Medications





Amlodipine Besylate (Norvasc -)  10 mg PO DAILY [facility]


   Last Admin: 02/16/20 09:25 Dose:  10 mg


Cholecalciferol (Vitamin D3 -)  2,000 unit PO DAILY [facility]


   Last Admin: 02/16/20 09:25 Dose:  2,000 unit


Heparin Sodium (Porcine) (Heparin -)  5,000 unit IVPUSH PRN PRN


   PRN Reason: APTT (SECONDS) <40


Heparin Sodium (Porcine) (Heparin -)  1,000 unit IVPUSH PRN PRN


   PRN Reason: APTT (SECONDS) 40-49


Heparin Sodium/Dextrose (Heparin Infusion -)  25,000 units in 500 mls @ 20 mls/

hr IVPB TITR [facility]; Protocol


   Last Admin: 02/16/20 08:41 Dose:  1,000 units/hr, 20 mls/hr


Metoprolol Succinate (Toprol Xl -)  50 mg PO DAILY [facility]


   Last Admin: 02/16/20 09:26 Dose:  Not Given


Rosuvastatin Calcium (Crestor -)  5 mg PO [facility]


Valsartan (Diovan -)  320 mg PO DAILY [facility]


   Last Admin: 02/16/20 09:25 Dose:  320 mg





Review of Systems





Cardiovascular: As noted above


Respiratory: denies: Cough or Sputum Production


Gastrointestinal: denies: Nausea, Vomiting, Diarrhea, Constipation or Abdominal 

Discomfort  


Musculoskeletal: No symptoms reported 


Genitourinary: No symptoms reported





Vital Signs: 





 


 


 Last Vital Signs











Temp Pulse Resp BP Pulse Ox


 


 98.7 F   104 H  20   128/94   98 


 


 02/16/20 06:00  02/16/20 06:00  02/16/20 06:00  02/16/20 06:00  02/15/20 17:57








 Intake & Output











 02/13/20 02/14/20 02/15/20 02/16/20





 23:59 23:59 23:59 23:59


 


Intake Total   100 


 


Balance   100 


 


Weight   203 lb 8 oz 











Constitutional: No Distress, Calm


Neck: Supple Negative JVD


Respiratory: Clear to A&P Bilaterally


Cardiovascular: S1 S2 Irregularly Irregular no Murmurs


Gastrointestinal: Soft Benign Normal Bowel Sounds


Ext: No Edema





Labs: 





 


 


 Troponin, BNP











  02/15/20 02/15/20





  13:03 13:03


 


Troponin I   < 0.02


 


B-Natriuretic Peptide  413.8 








 CBC, BMP





 02/15/20 13:03 





 02/15/20 13:03 





 Hepatic Panel











Total Bilirubin  1.4 mg/dL (0.2-1)  H  02/15/20  13:03    


 


AST  25 U/L (15-37)   02/15/20  13:03    


 


ALT  27 U/L (13-61)   02/15/20  13:03    


 


Alkaline Phosphatase  37 U/L ()  L  02/15/20  13:03    


 


Albumin  3.5 g/dl (3.4-5.0)   02/15/20  13:03    








 INR, PTT











INR  0.98  (0.83-1.09)   02/15/20  13:03    











Assessment/Plan





ASSESSMENT:





1. Paroxysmal atrial fibrillation new onset- duration unknown with [id number] 

score of 3-4, periods of rapid ventricular response- asymptomatic 


2. Coronary artery disease/coronary artery calcification visual calcification 

on CT scan of the abdomen angina pectoris


3. Diastolic left ventricular dysfunction with clinical class 0 NYHA 

classification LV failure


4. HTN


5. Hypercholesterolemia


6. Polycythmia





PLAN:





1. Continue Toprol XL and titrate dosage as needed and as tolerated


2. Continue Norvasc


3. Continue Diovan


4. Considering the above noted [id number] score of 3-4 recommend long term A/C 

preferably with DOAC's unless it is absolutely contraindicated, initiate 

Eliquis therapy and D/C Heparin 2 hours after 1st Eliquis dose


5. Add HCTZ


6. Continue Crestor


7. Can be D/C home from the cardiovascular point of view on the above noted 

therapies and F/U in the office with Dr. SERGIO Finley for additional cardiovascular 

evaluation and management














Kendra Bonilla MD

## 2020-02-16 NOTE — CONS
DATE OF CONSULTATION:  02/16/2020

 

CONSULTATION REQUESTED BY:  Brett Avalos MD

 

CHIEF COMPLAINT:  Evaluation of cardiac arrhythmia, asymptomatic/evaluation of

new-onset paroxysmal atrial fibrillation.

 

A 79-year-old male, of /[place] descent, with known history of coronary artery

disease, coronary artery calcification on CT scan of the abdomen image review, with

no clinical angina pectoris, diastolic left ventricular dysfunction with clinical

class 0 New [place] Heart Association classification left ventricular failure,

hypertensive cardiovascular disease, hypercholesterolemia, who presented to his

primary care's office for routine followup evaluation, at which point he was noted to

have evidence of new-onset atrial fibrillation, the duration of which was not clear. 

The patient denied any palpitations.  The patient did not report any chest

discomfort.  Patient reports dyspnea with moderate physical exertion.  Patient denies

any orthopnea, paroxysmal nocturnal dyspnea, or peripheral edema.  Patient denies any

dizziness, lightheadedness, near syncope or syncope.  Patient denies any fatigue or

tiredness.  Upon evaluation in the emergency room, patient was noted to have evidence

of atrial fibrillation with intermittent rapid ventricular conduction.  

 

PAST MEDICAL HISTORY:  Coronary artery disease, coronary artery calcification on CT

scan of the abdomen image review, with no clinical angina pectoris, diastolic left

ventricular dysfunction with clinical class 0 New [place] Heart Association

classification left ventricular failure, hypertensive cardiovascular disease,

hypercholesterolemia.

 

PAST SURGICAL HISTORY:  Nasal sinus surgery, prostate surgery, hernia surgery.

 

SOCIAL HISTORY:  Nonsmoker.

 

FAMILY HISTORY:  Father:  Coronary artery disease.

 

ALLERGIES:  None reported.

 

Medical therapy at home included vitamin D 2000 international units once a day,

Crestor 5 mg once a day, Norvasc 10 mg once a day, Toprol XL 50 mg once a day, Diovan

320 mg once daily.  

 

REVIEW OF SYSTEMS:

Head and Neck:  Denies headache, photophobia, blurring of vision.

Respiratory:  No cough or sputum production.

Cardiovascular:  As noted above.

Gastrointestinal:  Denies nausea, vomiting, diarrhea, abdominal discomfort.

Genitourinary:  No symptoms reported.

Musculoskeletal:  No symptoms reported.

 

PHYSICAL EXAMINATION:

Vital Signs:  Blood pressure is 128/94 mmHg.  Pulse rate is 104 beats per minute.

Head and Neck:  Pupils equal, react to light and accommodation.  Extraocular muscles

are intact.  Anicteric sclerae.  Negative JVD.  No bruit appreciated. 

Chest:  Clear to auscultation and percussion.

Cardiovascular:  S1, S2, irregularly irregular.  No gallops or clicks.

Abdomen:  Soft, benign.  Normoactive bowel sound.

Extremities:  Negative edema.  Intact distal pulses.  No calf tenderness. 

 

Electrocardiogram reveals atrial fibrillation with nonspecific ST-segment

abnormality.  Troponin less than 0.02.  .8.  CBC revealed a white cell count

10.5, hemoglobin 18.7, platelet count 180.  Basic metabolic profile revealed sodium

141, potassium 4.6, BUN 15, creatinine 1.0, glucose 102.  INR 0.98.  Patient was

initiated on heparin in the emergency room. 

 

ASSESSMENT:

1.  Paroxysmal atrial fibrillation, new onset, duration unknown, with [id number]

score of 3 to 4 with periods of rapid ventricular response, asymptomatic. 

2.  Coronary artery disease/coronary artery visual calcification on CT scan of the

abdomen with no clinical angina pectoris.

3.  Diastolic left ventricular dysfunction with clinical class 0 New York Heart

Association classification left ventricular failure.

4.  Hypertension/hypertensive cardiovascular disease.

5.  Hypercholesterolemia.

6.  Polycythemia.   

 

RECOMMENDATION:

1.  Continuation of Toprol XL and titration of dosage as needed and as tolerated.  

2.  Continuation of Norvasc.

3.  Continuation of Diovan.

4.  Addition of hydrochlorothiazide therapy.

5.  Consider the above-noted CHADS-VASc score of 3 to 4.  Recommend long-term

anticoagulation, preferably with DOACs, unless it is absolutely contraindicated. 

Initiate Eliquis therapy at 5 mg twice daily and discontinue heparin therapy 2 hours

after first Eliquis dose.

6.  Continuation of Crestor therapy.

7.  Patient can be discharged home, from the cardiovascular point of view, on the

above-noted therapies and follow up in the office with Dr. Dimitri Finley for additional

cardiovascular evaluation and management.

 

Thank you for the kind referral.

 

 

 

MEREDITH KAY M.D.

 

[id number]

DD: 02/16/2020 10:26

DT: 02/16/2020 15:11

Job #:  [id number]

## 2020-02-17 VITALS — SYSTOLIC BLOOD PRESSURE: 136 MMHG | HEART RATE: 68 BPM | DIASTOLIC BLOOD PRESSURE: 83 MMHG | TEMPERATURE: 98.1 F

## 2020-02-17 RX ADMIN — VITAMIN D, TAB 1000IU (100/BT) SCH UNIT: 25 TAB at 09:05

## 2020-02-17 RX ADMIN — APIXABAN SCH MG: 5 TABLET, FILM COATED ORAL at 09:05

## 2020-02-17 RX ADMIN — VALSARTAN SCH MG: 160 TABLET, FILM COATED ORAL at 09:06

## 2020-02-17 RX ADMIN — AMLODIPINE BESYLATE SCH MG: 10 TABLET ORAL at 09:06

## 2020-02-17 RX ADMIN — HYDROCHLOROTHIAZIDE SCH MG: 25 TABLET ORAL at 09:05

## 2020-02-17 NOTE — EKG
Test Reason : 

Blood Pressure : ***/*** mmHG

Vent. Rate : 117 BPM     Atrial Rate : 122 BPM

   P-R Int : 000 ms          QRS Dur : 090 ms

    QT Int : 294 ms       P-R-T Axes : 000 030 049 degrees

   QTc Int : 410 ms

 

ATRIAL FIBRILLATION WITH RAPID VENTRICULAR RESPONSE

ABNORMAL ECG

WHEN COMPARED WITH ECG OF 25-MAY-2018 04:15,

ATRIAL FIBRILLATION HAS REPLACED SINUS RHYTHM

VENT. RATE HAS INCREASED BY  53 BPM

T WAVE INVERSION NO LONGER EVIDENT IN LATERAL LEADS

Confirmed by Ines Montano ([id number]) on 2/17/2020 10:25:27 AM

 

Referred By:             Confirmed By:Ines Montano

## 2020-02-17 NOTE — DS
Physical Examination


Vital Signs: 


 Vital Signs











Temperature  98.1 F   02/17/20 09:00


 


Pulse Rate  68   02/17/20 09:00


 


Respiratory Rate  22 H  02/17/20 09:00


 


Blood Pressure  136/83   02/17/20 09:00


 


O2 Sat by Pulse Oximetry (%)  94 L  02/17/20 09:00











Findings/Remarks: 





Case discussed with Dr Yin 


Paroxysmal Afib


DC home on Elaquis


Constitutional: Yes: No Distress


Eyes: Yes: WNL


HENT: Yes: WNL


Neck: Yes: WNL


Cardiovascular: Yes: WNL


Respiratory: Yes: WNL


Gastrointestinal: Yes: WNL


...Rectal Exam: Yes: Deferred


Renal/: Yes: WNL


Musculoskeletal: Yes: WNL


Extremities: Yes: WNL


Edema: No


Neurological: Yes: Alert


Psychiatric: Yes: Alert


Labs: 


 CBC, BMP





 02/15/20 13:03 





 02/15/20 13:03 











Discharge Summary


Problems reviewed: Yes


Reason For Visit: NEW ONSET ATRIAL FIBRILATION


Current Active Problems





New onset a-fib (Acute)








Condition: Guarded





- Instructions


Referrals: 


Brett Avalos MD [Primary Care Provider] - 





- Home Medications


Comprehensive Discharge Medication List: 


Ambulatory Orders





Cholecalciferol (Vitamin D3) [Vitamin D3] 2,000 unit PO DAILY 03/12/18 


Rosuvastatin Calcium [Crestor] 5 mg PO DAILY 03/12/18 


Amlodipine Besylate [Norvasc -] 10 mg PO DAILY  tablet 05/28/18 


Metoprolol Succinate [Toprol XL -] 50 mg PO DAILY  tab.sr.24h 05/28/18 


Valsartan [Diovan] 320 mg PO DAILY  tablet 05/28/18

## 2020-02-17 NOTE — PN
Progress Note (short form)





- Note


Progress Note: 


Chief Complaint: Events noted, notes reviewed, converted spontaneously to sinus 

rhythm, denies any palpitations, denies any chest pain or dyspnea





History of Present Illness: 


Seen and examined on telemetry. Events noted, notes reviewed, converted 

spontaneously to sinus rhythm, denies any palpitations, denies any chest pain 

or dyspnea





Medications: 





 


 


 Current Medications





Amlodipine Besylate (Norvasc -)  10 mg PO DAILY [facility]


   Last Admin: 02/16/20 09:25 Dose:  10 mg


Apixaban (Eliquis -)  5 mg PO BID [facility]


   Last Admin: 02/16/20 21:37 Dose:  5 mg


Cholecalciferol (Vitamin D3 -)  2,000 unit PO DAILY [facility]


   Last Admin: 02/16/20 09:25 Dose:  2,000 unit


Hydrochlorothiazide (Hctz -)  25 mg PO DAILY [facility]


   Last Admin: 02/16/20 11:13 Dose:  25 mg


Metoprolol Succinate (Toprol Xl -)  50 mg PO BID [facility]


   Last Admin: 02/16/20 21:37 Dose:  50 mg


Rosuvastatin Calcium (Crestor -)  5 mg PO HS [facility]


   Last Admin: 02/16/20 21:37 Dose:  5 mg


Valsartan (Diovan -)  320 mg PO DAILY [facility]


   Last Admin: 02/16/20 09:25 Dose:  320 mg








Review of Systems





Cardiovascular: As noted above


Respiratory: denies: Cough or Sputum Production


Gastrointestinal: denies: Nausea, Vomiting, Diarrhea, Constipation or Abdominal 

Discomfort  


Musculoskeletal: No symptoms reported 


Genitourinary: No symptoms reported





Vital Signs: 





 


 


 Last Vital Signs











Temp Pulse Resp BP Pulse Ox


 


 98.8 F   65   20   136/88   95 


 


 02/17/20 06:17  02/17/20 06:17  02/17/20 06:17  02/17/20 06:17  02/16/20 20:34








 Intake & Output











 02/14/20 02/15/20 02/16/20 02/17/20





 23:59 23:59 23:59 23:59


 


Intake Total  100 360 120


 


Balance  100 360 120


 


Weight  203 lb 8 oz  











Constitutional: No Distress, Calm


Neck: Supple Negative JVD


Respiratory: Clear to A&P Bilaterally


Cardiovascular: S1 S2 Regular Rate Rhythm no Murmurs


Gastrointestinal: Soft Benign Normal Bowel Sounds


Ext: No Edema





Labs: 





 


 


 


 CBC, BMP





 02/15/20 13:03 





 02/15/20 13:03 





 Hepatic Panel











Total Bilirubin  1.4 mg/dL (0.2-1)  H  02/15/20  13:03    


 


AST  25 U/L (15-37)   02/15/20  13:03    


 


ALT  27 U/L (13-61)   02/15/20  13:03    


 


Alkaline Phosphatase  37 U/L ()  L  02/15/20  13:03    


 


Albumin  3.5 g/dl (3.4-5.0)   02/15/20  13:03    








 INR, PTT











INR  0.98  (0.83-1.09)   02/15/20  13:03    











Assessment/Plan





ASSESSMENT:





1. Paroxysmal atrial fibrillation (new onset- duration unknown) post 

spontaneous conversion to sinus rhythm with [id number] score of 3-4, 

asymptomatic on A/C with Eliquis 


2. Coronary artery disease/coronary artery calcification visual calcification 

on CT scan of the abdomen angina pectoris


3. Diastolic left ventricular dysfunction with clinical class 0 NYHA 

classification LV failure


4. HTN


5. Hypercholesterolemia


6. Polycythmia





PLAN:





1. Continue Toprol XL 


2. Continue Norvasc


3. Continue Diovan and HCTZ  


4. Continue A/C therapy with DOAC's/Eliquis considering the above noted 

[id number] score of 3-4 unless it is absolutely contraindicated


5. Continue Crestor


6. Can be D/C home from the cardiovascular point of view on the above noted 

therapies and F/U in the office with Dr. SERGIO Finley for additional cardiovascular 

evaluation and management/[phone]














Kendra Bonilla MD

## 2020-03-02 ENCOUNTER — HOSPITAL ENCOUNTER (OUTPATIENT)
Dept: HOSPITAL 74 - JASU-SURG | Age: 80
Discharge: HOME | End: 2020-03-02
Attending: INTERNAL MEDICINE
Payer: COMMERCIAL

## 2020-03-02 VITALS — SYSTOLIC BLOOD PRESSURE: 147 MMHG | TEMPERATURE: 97.3 F | DIASTOLIC BLOOD PRESSURE: 74 MMHG | HEART RATE: 60 BPM

## 2020-03-02 VITALS — BODY MASS INDEX: 31.4 KG/M2

## 2020-03-02 DIAGNOSIS — Z53.8: Primary | ICD-10-CM

## 2020-03-02 NOTE — EKG
Test Reason : 

Blood Pressure : ***/*** mmHG

Vent. Rate : 060 BPM     Atrial Rate : 060 BPM

   P-R Int : 198 ms          QRS Dur : 088 ms

    QT Int : 420 ms       P-R-T Axes : 034 009 027 degrees

   QTc Int : 420 ms

 

NORMAL SINUS RHYTHM

INFERIOR INFARCT , AGE UNDETERMINED

ABNORMAL ECG

WHEN COMPARED WITH ECG OF 15-FEB-2020 11:46,

SINUS RHYTHM HAS REPLACED ATRIAL FIBRILLATION

VENT. RATE HAS DECREASED BY  57 BPM

INFERIOR INFARCT IS NOW PRESENT

NON-SPECIFIC CHANGE IN ST SEGMENT IN ANTERIOR LEADS

Confirmed by Ines Montano ([id number]) on 3/2/2020 12:06:22 PM

 

Referred By: Dimitri Finley           Confirmed By:Ines Montano

## 2023-08-27 ENCOUNTER — HOSPITAL ENCOUNTER (INPATIENT)
Dept: HOSPITAL 74 - JER | Age: 83
LOS: 8 days | Discharge: HOME | DRG: 177 | End: 2023-09-04
Attending: INTERNAL MEDICINE | Admitting: INTERNAL MEDICINE
Payer: COMMERCIAL

## 2023-08-27 VITALS — BODY MASS INDEX: 30.9 KG/M2

## 2023-08-27 DIAGNOSIS — Z79.01: ICD-10-CM

## 2023-08-27 DIAGNOSIS — A08.39: ICD-10-CM

## 2023-08-27 DIAGNOSIS — I11.0: ICD-10-CM

## 2023-08-27 DIAGNOSIS — U07.1: Primary | ICD-10-CM

## 2023-08-27 DIAGNOSIS — I50.32: ICD-10-CM

## 2023-08-27 DIAGNOSIS — N40.0: ICD-10-CM

## 2023-08-27 DIAGNOSIS — J12.82: ICD-10-CM

## 2023-08-27 DIAGNOSIS — R09.02: ICD-10-CM

## 2023-08-27 DIAGNOSIS — E78.00: ICD-10-CM

## 2023-08-27 DIAGNOSIS — R94.31: ICD-10-CM

## 2023-08-27 DIAGNOSIS — I45.10: ICD-10-CM

## 2023-08-27 DIAGNOSIS — I25.119: ICD-10-CM

## 2023-08-27 DIAGNOSIS — I48.0: ICD-10-CM

## 2023-08-27 LAB
ALBUMIN SERPL-MCNC: 3 G/DL (ref 3.4–5)
ALP SERPL-CCNC: 30 U/L (ref 45–117)
ALT SERPL-CCNC: 29 U/L (ref 13–61)
ANION GAP SERPL CALC-SCNC: 7 MMOL/L (ref 8–16)
APTT BLD: 33.4 SECONDS (ref 25.2–36.5)
AST SERPL-CCNC: 22 U/L (ref 15–37)
BASOPHILS # BLD: 0.6 % (ref 0–2)
BILIRUB SERPL-MCNC: 1.4 MG/DL (ref 0.2–1)
BNP SERPL-MCNC: 966 PG/ML (ref 5–450)
BUN SERPL-MCNC: 19.6 MG/DL (ref 7–18)
CALCIUM SERPL-MCNC: 9.1 MG/DL (ref 8.5–10.1)
CHLORIDE SERPL-SCNC: 111 MMOL/L (ref 98–107)
CO2 SERPL-SCNC: 28 MMOL/L (ref 21–32)
CREAT SERPL-MCNC: 1.2 MG/DL (ref 0.55–1.3)
DEPRECATED RDW RBC AUTO: 15.5 % (ref 11.9–15.9)
EOSINOPHIL # BLD: 1 % (ref 0–4.5)
GLUCOSE SERPL-MCNC: 104 MG/DL (ref 74–106)
HCT VFR BLD CALC: 43.2 % (ref 35.4–49)
HGB BLD-MCNC: 14.4 GM/DL (ref 11.7–16.9)
INR BLD: 1.28 (ref 0.83–1.09)
LIPASE SERPL-CCNC: 268 U/L (ref 73–393)
LYMPHOCYTES # BLD: 21.1 % (ref 8–40)
MCH RBC QN AUTO: 28.4 PG (ref 25.7–33.7)
MCHC RBC AUTO-ENTMCNC: 33.3 G/DL (ref 32–35.9)
MCV RBC: 85.1 FL (ref 80–96)
MONOCYTES # BLD AUTO: 11.2 % (ref 3.8–10.2)
NEUTROPHILS # BLD: 66.1 % (ref 42.8–82.8)
PLATELET # BLD AUTO: 123 10^3/UL (ref 134–434)
PMV BLD: 9.1 FL (ref 7.5–11.1)
POTASSIUM SERPLBLD-SCNC: 4 MMOL/L (ref 3.5–5.1)
PROT SERPL-MCNC: 6.6 G/DL (ref 6.4–8.2)
PT PNL PPP: 14.8 SEC (ref 9.7–13)
RBC # BLD AUTO: 5.08 M/MM3 (ref 4–5.6)
SODIUM SERPL-SCNC: 145 MMOL/L (ref 136–145)
WBC # BLD AUTO: 5.4 K/MM3 (ref 4–10)

## 2023-08-27 PROCEDURE — XW033E5 INTRODUCTION OF REMDESIVIR ANTI-INFECTIVE INTO PERIPHERAL VEIN, PERCUTANEOUS APPROACH, NEW TECHNOLOGY GROUP 5: ICD-10-PCS | Performed by: INTERNAL MEDICINE

## 2023-08-27 PROCEDURE — C9399 UNCLASSIFIED DRUGS OR BIOLOG: HCPCS

## 2023-08-27 PROCEDURE — 3E0333Z INTRODUCTION OF ANTI-INFLAMMATORY INTO PERIPHERAL VEIN, PERCUTANEOUS APPROACH: ICD-10-PCS | Performed by: INTERNAL MEDICINE

## 2023-08-27 RX ADMIN — OXYCODONE HYDROCHLORIDE AND ACETAMINOPHEN SCH MG: 500 TABLET ORAL at 21:39

## 2023-08-28 LAB
ALBUMIN SERPL-MCNC: 2.9 G/DL (ref 3.4–5)
ALP SERPL-CCNC: 29 U/L (ref 45–117)
ALT SERPL-CCNC: 29 U/L (ref 13–61)
ANION GAP SERPL CALC-SCNC: 4 MMOL/L (ref 8–16)
AST SERPL-CCNC: 17 U/L (ref 15–37)
BASOPHILS # BLD: 0.4 % (ref 0–2)
BILIRUB DIRECT SERPL-MCNC: 149 U/L (ref 87–246)
BILIRUB SERPL-MCNC: 0.7 MG/DL (ref 0.2–1)
BUN SERPL-MCNC: 22.2 MG/DL (ref 7–18)
CALCIUM SERPL-MCNC: 9.7 MG/DL (ref 8.5–10.1)
CHLORIDE SERPL-SCNC: 115 MMOL/L (ref 98–107)
CO2 SERPL-SCNC: 26 MMOL/L (ref 21–32)
CREAT SERPL-MCNC: 1 MG/DL (ref 0.55–1.3)
DEPRECATED RDW RBC AUTO: 15.1 % (ref 11.9–15.9)
EOSINOPHIL # BLD: 0 % (ref 0–4.5)
GLUCOSE SERPL-MCNC: 152 MG/DL (ref 74–106)
HCT VFR BLD CALC: 45 % (ref 35.4–49)
HGB BLD-MCNC: 14.9 GM/DL (ref 11.7–16.9)
LYMPHOCYTES # BLD: 9.6 % (ref 8–40)
MCH RBC QN AUTO: 28.6 PG (ref 25.7–33.7)
MCHC RBC AUTO-ENTMCNC: 33 G/DL (ref 32–35.9)
MCV RBC: 86.4 FL (ref 80–96)
MONOCYTES # BLD AUTO: 4.9 % (ref 3.8–10.2)
NEUTROPHILS # BLD: 85.1 % (ref 42.8–82.8)
PLATELET # BLD AUTO: 133 10^3/UL (ref 134–434)
PMV BLD: 9.1 FL (ref 7.5–11.1)
POTASSIUM SERPLBLD-SCNC: 4.8 MMOL/L (ref 3.5–5.1)
PROT SERPL-MCNC: 6.4 G/DL (ref 6.4–8.2)
RBC # BLD AUTO: 5.2 M/MM3 (ref 4–5.6)
SODIUM SERPL-SCNC: 144 MMOL/L (ref 136–145)
WBC # BLD AUTO: 7 K/MM3 (ref 4–10)

## 2023-08-28 RX ADMIN — ROSUVASTATIN CALCIUM SCH MG: 5 TABLET, FILM COATED ORAL at 21:53

## 2023-08-28 RX ADMIN — APIXABAN SCH MG: 5 TABLET, FILM COATED ORAL at 10:33

## 2023-08-28 RX ADMIN — VALSARTAN SCH MG: 80 TABLET, FILM COATED ORAL at 10:33

## 2023-08-28 RX ADMIN — OXYCODONE HYDROCHLORIDE AND ACETAMINOPHEN SCH MG: 500 TABLET ORAL at 10:33

## 2023-08-28 RX ADMIN — OXYCODONE HYDROCHLORIDE AND ACETAMINOPHEN SCH MG: 500 TABLET ORAL at 21:53

## 2023-08-28 RX ADMIN — APIXABAN SCH MG: 5 TABLET, FILM COATED ORAL at 21:53

## 2023-08-28 RX ADMIN — DEXAMETHASONE SODIUM PHOSPHATE SCH MG: 10 INJECTION, SOLUTION INTRAMUSCULAR; INTRAVENOUS at 10:38

## 2023-08-28 RX ADMIN — SOLIFENACIN SUCCINATE SCH MG: 5 TABLET, FILM COATED ORAL at 10:33

## 2023-08-28 RX ADMIN — VITAMIN D, TAB 1000IU (100/BT) SCH UNIT: 25 TAB at 10:33

## 2023-08-29 LAB
ANION GAP SERPL CALC-SCNC: 6 MMOL/L (ref 8–16)
BASOPHILS # BLD: 0.1 % (ref 0–2)
BUN SERPL-MCNC: 23.4 MG/DL (ref 7–18)
CALCIUM SERPL-MCNC: 9.7 MG/DL (ref 8.5–10.1)
CHLORIDE SERPL-SCNC: 111 MMOL/L (ref 98–107)
CO2 SERPL-SCNC: 28 MMOL/L (ref 21–32)
CREAT SERPL-MCNC: 1 MG/DL (ref 0.55–1.3)
DEPRECATED RDW RBC AUTO: 15.5 % (ref 11.9–15.9)
EOSINOPHIL # BLD: 0 % (ref 0–4.5)
GLUCOSE SERPL-MCNC: 130 MG/DL (ref 74–106)
HCT VFR BLD CALC: 47.4 % (ref 35.4–49)
HGB BLD-MCNC: 15.6 GM/DL (ref 11.7–16.9)
LYMPHOCYTES # BLD: 6.6 % (ref 8–40)
MCH RBC QN AUTO: 28.6 PG (ref 25.7–33.7)
MCHC RBC AUTO-ENTMCNC: 32.9 G/DL (ref 32–35.9)
MCV RBC: 87.2 FL (ref 80–96)
MONOCYTES # BLD AUTO: 4.7 % (ref 3.8–10.2)
NEUTROPHILS # BLD: 88.6 % (ref 42.8–82.8)
PLATELET # BLD AUTO: 191 10^3/UL (ref 134–434)
PMV BLD: 9.4 FL (ref 7.5–11.1)
POTASSIUM SERPLBLD-SCNC: 4.5 MMOL/L (ref 3.5–5.1)
RBC # BLD AUTO: 5.43 M/MM3 (ref 4–5.6)
SODIUM SERPL-SCNC: 144 MMOL/L (ref 136–145)
WBC # BLD AUTO: 16.7 K/MM3 (ref 4–10)

## 2023-08-29 RX ADMIN — VITAMIN D, TAB 1000IU (100/BT) SCH UNIT: 25 TAB at 09:09

## 2023-08-29 RX ADMIN — VALSARTAN SCH MG: 80 TABLET, FILM COATED ORAL at 09:09

## 2023-08-29 RX ADMIN — APIXABAN SCH MG: 5 TABLET, FILM COATED ORAL at 09:09

## 2023-08-29 RX ADMIN — OXYCODONE HYDROCHLORIDE AND ACETAMINOPHEN SCH MG: 500 TABLET ORAL at 21:30

## 2023-08-29 RX ADMIN — OXYCODONE HYDROCHLORIDE AND ACETAMINOPHEN SCH MG: 500 TABLET ORAL at 09:09

## 2023-08-29 RX ADMIN — ROSUVASTATIN CALCIUM SCH MG: 5 TABLET, FILM COATED ORAL at 21:30

## 2023-08-29 RX ADMIN — APIXABAN SCH MG: 5 TABLET, FILM COATED ORAL at 21:30

## 2023-08-29 RX ADMIN — SOLIFENACIN SUCCINATE SCH MG: 5 TABLET, FILM COATED ORAL at 10:39

## 2023-08-29 RX ADMIN — DEXAMETHASONE SODIUM PHOSPHATE SCH MG: 10 INJECTION, SOLUTION INTRAMUSCULAR; INTRAVENOUS at 09:09

## 2023-08-30 LAB
ANION GAP SERPL CALC-SCNC: 5 MMOL/L (ref 8–16)
BASOPHILS # BLD: 0.2 % (ref 0–2)
BUN SERPL-MCNC: 27.1 MG/DL (ref 7–18)
CALCIUM SERPL-MCNC: 9.4 MG/DL (ref 8.5–10.1)
CHLORIDE SERPL-SCNC: 109 MMOL/L (ref 98–107)
CO2 SERPL-SCNC: 29 MMOL/L (ref 21–32)
CREAT SERPL-MCNC: 1.1 MG/DL (ref 0.55–1.3)
DEPRECATED RDW RBC AUTO: 15.1 % (ref 11.9–15.9)
EOSINOPHIL # BLD: 0 % (ref 0–4.5)
GLUCOSE SERPL-MCNC: 135 MG/DL (ref 74–106)
HCT VFR BLD CALC: 47.5 % (ref 35.4–49)
HGB BLD-MCNC: 15.5 GM/DL (ref 11.7–16.9)
LYMPHOCYTES # BLD: 5.8 % (ref 8–40)
MCH RBC QN AUTO: 28.3 PG (ref 25.7–33.7)
MCHC RBC AUTO-ENTMCNC: 32.6 G/DL (ref 32–35.9)
MCV RBC: 86.7 FL (ref 80–96)
MONOCYTES # BLD AUTO: 6.5 % (ref 3.8–10.2)
NEUTROPHILS # BLD: 87.5 % (ref 42.8–82.8)
PLATELET # BLD AUTO: 223 10^3/UL (ref 134–434)
PMV BLD: 9.5 FL (ref 7.5–11.1)
POTASSIUM SERPLBLD-SCNC: 3.8 MMOL/L (ref 3.5–5.1)
RBC # BLD AUTO: 5.47 M/MM3 (ref 4–5.6)
SODIUM SERPL-SCNC: 143 MMOL/L (ref 136–145)
WBC # BLD AUTO: 14.4 K/MM3 (ref 4–10)

## 2023-08-30 RX ADMIN — VITAMIN D, TAB 1000IU (100/BT) SCH UNIT: 25 TAB at 09:23

## 2023-08-30 RX ADMIN — VALSARTAN SCH MG: 80 TABLET, FILM COATED ORAL at 09:23

## 2023-08-30 RX ADMIN — REMDESIVIR SCH MLS/HR: 5 INJECTION INTRAVENOUS at 18:01

## 2023-08-30 RX ADMIN — APIXABAN SCH MG: 5 TABLET, FILM COATED ORAL at 09:23

## 2023-08-30 RX ADMIN — APIXABAN SCH MG: 5 TABLET, FILM COATED ORAL at 22:26

## 2023-08-30 RX ADMIN — OXYCODONE HYDROCHLORIDE AND ACETAMINOPHEN SCH MG: 500 TABLET ORAL at 09:23

## 2023-08-30 RX ADMIN — OXYCODONE HYDROCHLORIDE AND ACETAMINOPHEN SCH MG: 500 TABLET ORAL at 22:26

## 2023-08-30 RX ADMIN — ROSUVASTATIN CALCIUM SCH MG: 5 TABLET, FILM COATED ORAL at 22:50

## 2023-08-30 RX ADMIN — SOLIFENACIN SUCCINATE SCH MG: 5 TABLET, FILM COATED ORAL at 09:23

## 2023-08-30 RX ADMIN — DEXAMETHASONE SODIUM PHOSPHATE SCH MG: 10 INJECTION, SOLUTION INTRAMUSCULAR; INTRAVENOUS at 09:22

## 2023-08-31 LAB
ALBUMIN SERPL-MCNC: 2.9 G/DL (ref 3.4–5)
ALP SERPL-CCNC: 30 U/L (ref 45–117)
ALT SERPL-CCNC: 36 U/L (ref 13–61)
ANION GAP SERPL CALC-SCNC: 6 MMOL/L (ref 8–16)
AST SERPL-CCNC: 18 U/L (ref 15–37)
BASOPHILS # BLD: 0.2 % (ref 0–2)
BILIRUB SERPL-MCNC: 0.7 MG/DL (ref 0.2–1)
BUN SERPL-MCNC: 28.3 MG/DL (ref 7–18)
CALCIUM SERPL-MCNC: 9.3 MG/DL (ref 8.5–10.1)
CHLORIDE SERPL-SCNC: 105 MMOL/L (ref 98–107)
CO2 SERPL-SCNC: 33 MMOL/L (ref 21–32)
CREAT SERPL-MCNC: 1.2 MG/DL (ref 0.55–1.3)
DEPRECATED RDW RBC AUTO: 15.2 % (ref 11.9–15.9)
EOSINOPHIL # BLD: 0 % (ref 0–4.5)
GLUCOSE SERPL-MCNC: 125 MG/DL (ref 74–106)
HCT VFR BLD CALC: 47 % (ref 35.4–49)
HGB BLD-MCNC: 15.5 GM/DL (ref 11.7–16.9)
LYMPHOCYTES # BLD: 13.1 % (ref 8–40)
MCH RBC QN AUTO: 28.3 PG (ref 25.7–33.7)
MCHC RBC AUTO-ENTMCNC: 33.1 G/DL (ref 32–35.9)
MCV RBC: 85.5 FL (ref 80–96)
MONOCYTES # BLD AUTO: 8.3 % (ref 3.8–10.2)
NEUTROPHILS # BLD: 78.4 % (ref 42.8–82.8)
PLATELET # BLD AUTO: 197 10^3/UL (ref 134–434)
PMV BLD: 8.8 FL (ref 7.5–11.1)
POTASSIUM SERPLBLD-SCNC: 4 MMOL/L (ref 3.5–5.1)
PROT SERPL-MCNC: 6.3 G/DL (ref 6.4–8.2)
RBC # BLD AUTO: 5.49 M/MM3 (ref 4–5.6)
SODIUM SERPL-SCNC: 144 MMOL/L (ref 136–145)
WBC # BLD AUTO: 12.8 K/MM3 (ref 4–10)

## 2023-08-31 RX ADMIN — REMDESIVIR SCH MLS/HR: 5 INJECTION INTRAVENOUS at 17:23

## 2023-08-31 RX ADMIN — APIXABAN SCH MG: 5 TABLET, FILM COATED ORAL at 10:43

## 2023-08-31 RX ADMIN — ROSUVASTATIN CALCIUM SCH MG: 5 TABLET, FILM COATED ORAL at 21:44

## 2023-08-31 RX ADMIN — VITAMIN D, TAB 1000IU (100/BT) SCH UNIT: 25 TAB at 10:43

## 2023-08-31 RX ADMIN — DEXAMETHASONE SODIUM PHOSPHATE SCH MG: 10 INJECTION, SOLUTION INTRAMUSCULAR; INTRAVENOUS at 10:43

## 2023-08-31 RX ADMIN — VALSARTAN SCH MG: 80 TABLET, FILM COATED ORAL at 10:43

## 2023-08-31 RX ADMIN — APIXABAN SCH MG: 5 TABLET, FILM COATED ORAL at 21:44

## 2023-08-31 RX ADMIN — OXYCODONE HYDROCHLORIDE AND ACETAMINOPHEN SCH MG: 500 TABLET ORAL at 10:43

## 2023-08-31 RX ADMIN — SOLIFENACIN SUCCINATE SCH MG: 5 TABLET, FILM COATED ORAL at 10:44

## 2023-08-31 RX ADMIN — OXYCODONE HYDROCHLORIDE AND ACETAMINOPHEN SCH MG: 500 TABLET ORAL at 21:44

## 2023-09-01 LAB
ANION GAP SERPL CALC-SCNC: 6 MMOL/L (ref 8–16)
ANISOCYTOSIS BLD QL: (no result)
BUN SERPL-MCNC: 31.4 MG/DL (ref 7–18)
CALCIUM SERPL-MCNC: 8.9 MG/DL (ref 8.5–10.1)
CHLORIDE SERPL-SCNC: 103 MMOL/L (ref 98–107)
CO2 SERPL-SCNC: 32 MMOL/L (ref 21–32)
CREAT SERPL-MCNC: 1.2 MG/DL (ref 0.55–1.3)
DEPRECATED RDW RBC AUTO: 14.8 % (ref 11.9–15.9)
GLUCOSE SERPL-MCNC: 123 MG/DL (ref 74–106)
HCT VFR BLD CALC: 47 % (ref 35.4–49)
HGB BLD-MCNC: 15.7 GM/DL (ref 11.7–16.9)
MACROCYTES BLD QL: 0
MCH RBC QN AUTO: 28.6 PG (ref 25.7–33.7)
MCHC RBC AUTO-ENTMCNC: 33.4 G/DL (ref 32–35.9)
MCV RBC: 85.5 FL (ref 80–96)
PLATELET # BLD AUTO: 205 10^3/UL (ref 134–434)
PMV BLD: 8.7 FL (ref 7.5–11.1)
POTASSIUM SERPLBLD-SCNC: 4.4 MMOL/L (ref 3.5–5.1)
RBC # BLD AUTO: 5.5 M/MM3 (ref 4–5.6)
SODIUM SERPL-SCNC: 141 MMOL/L (ref 136–145)
WBC # BLD AUTO: 13.8 K/MM3 (ref 4–10)

## 2023-09-01 RX ADMIN — VALSARTAN SCH MG: 80 TABLET, FILM COATED ORAL at 11:11

## 2023-09-01 RX ADMIN — APIXABAN SCH MG: 5 TABLET, FILM COATED ORAL at 22:45

## 2023-09-01 RX ADMIN — OXYCODONE HYDROCHLORIDE AND ACETAMINOPHEN SCH MG: 500 TABLET ORAL at 22:45

## 2023-09-01 RX ADMIN — DEXAMETHASONE SODIUM PHOSPHATE SCH: 10 INJECTION, SOLUTION INTRAMUSCULAR; INTRAVENOUS at 11:12

## 2023-09-01 RX ADMIN — SOLIFENACIN SUCCINATE SCH MG: 5 TABLET, FILM COATED ORAL at 11:11

## 2023-09-01 RX ADMIN — VITAMIN D, TAB 1000IU (100/BT) SCH UNIT: 25 TAB at 11:10

## 2023-09-01 RX ADMIN — OXYCODONE HYDROCHLORIDE AND ACETAMINOPHEN SCH MG: 500 TABLET ORAL at 11:11

## 2023-09-01 RX ADMIN — APIXABAN SCH MG: 5 TABLET, FILM COATED ORAL at 11:10

## 2023-09-01 RX ADMIN — ROSUVASTATIN CALCIUM SCH MG: 5 TABLET, FILM COATED ORAL at 22:45

## 2023-09-02 RX ADMIN — SOLIFENACIN SUCCINATE SCH MG: 5 TABLET, FILM COATED ORAL at 10:55

## 2023-09-02 RX ADMIN — OXYCODONE HYDROCHLORIDE AND ACETAMINOPHEN SCH MG: 500 TABLET ORAL at 22:09

## 2023-09-02 RX ADMIN — ROSUVASTATIN CALCIUM SCH MG: 5 TABLET, FILM COATED ORAL at 22:09

## 2023-09-02 RX ADMIN — VITAMIN D, TAB 1000IU (100/BT) SCH UNIT: 25 TAB at 10:54

## 2023-09-02 RX ADMIN — DEXAMETHASONE SCH MG: 4 TABLET ORAL at 10:54

## 2023-09-02 RX ADMIN — OXYCODONE HYDROCHLORIDE AND ACETAMINOPHEN SCH MG: 500 TABLET ORAL at 10:54

## 2023-09-02 RX ADMIN — VALSARTAN SCH MG: 80 TABLET, FILM COATED ORAL at 10:54

## 2023-09-02 RX ADMIN — APIXABAN SCH MG: 5 TABLET, FILM COATED ORAL at 22:09

## 2023-09-02 RX ADMIN — APIXABAN SCH MG: 5 TABLET, FILM COATED ORAL at 10:55

## 2023-09-03 RX ADMIN — VALSARTAN SCH MG: 80 TABLET, FILM COATED ORAL at 09:55

## 2023-09-03 RX ADMIN — SOLIFENACIN SUCCINATE SCH MG: 5 TABLET, FILM COATED ORAL at 10:03

## 2023-09-03 RX ADMIN — VITAMIN D, TAB 1000IU (100/BT) SCH UNIT: 25 TAB at 09:55

## 2023-09-03 RX ADMIN — OXYCODONE HYDROCHLORIDE AND ACETAMINOPHEN SCH MG: 500 TABLET ORAL at 09:55

## 2023-09-03 RX ADMIN — DEXAMETHASONE SCH MG: 4 TABLET ORAL at 10:03

## 2023-09-03 RX ADMIN — APIXABAN SCH MG: 5 TABLET, FILM COATED ORAL at 09:55

## 2023-09-03 RX ADMIN — OXYCODONE HYDROCHLORIDE AND ACETAMINOPHEN SCH MG: 500 TABLET ORAL at 22:05

## 2023-09-03 RX ADMIN — APIXABAN SCH MG: 5 TABLET, FILM COATED ORAL at 22:05

## 2023-09-03 RX ADMIN — ROSUVASTATIN CALCIUM SCH MG: 5 TABLET, FILM COATED ORAL at 22:04

## 2023-09-04 VITALS — HEART RATE: 100 BPM | DIASTOLIC BLOOD PRESSURE: 74 MMHG | SYSTOLIC BLOOD PRESSURE: 128 MMHG | TEMPERATURE: 98.2 F

## 2023-09-04 VITALS — RESPIRATION RATE: 18 BRPM

## 2023-09-04 RX ADMIN — VITAMIN D, TAB 1000IU (100/BT) SCH UNIT: 25 TAB at 10:11

## 2023-09-04 RX ADMIN — APIXABAN SCH MG: 5 TABLET, FILM COATED ORAL at 10:11

## 2023-09-04 RX ADMIN — DEXAMETHASONE SCH MG: 4 TABLET ORAL at 10:10

## 2023-09-04 RX ADMIN — OXYCODONE HYDROCHLORIDE AND ACETAMINOPHEN SCH MG: 500 TABLET ORAL at 10:10

## 2023-09-04 RX ADMIN — VALSARTAN SCH MG: 80 TABLET, FILM COATED ORAL at 10:10

## 2023-09-04 RX ADMIN — SOLIFENACIN SUCCINATE SCH MG: 5 TABLET, FILM COATED ORAL at 10:10

## 2023-09-12 ENCOUNTER — OFFICE (OUTPATIENT)
Dept: URBAN - METROPOLITAN AREA CLINIC 30 | Facility: CLINIC | Age: 83
Setting detail: OPHTHALMOLOGY
End: 2023-09-12
Payer: MEDICARE

## 2023-09-12 DIAGNOSIS — H10.32: ICD-10-CM

## 2023-09-12 PROCEDURE — 92012 INTRM OPH EXAM EST PATIENT: CPT | Performed by: OPHTHALMOLOGY

## 2023-09-12 ASSESSMENT — REFRACTION_AUTOREFRACTION
OD_AXIS: 045
OS_SPHERE: -1.50
OS_CYLINDER: +1.50
OS_AXIS: 155
OD_CYLINDER: +0.25
OD_SPHERE: -1.50

## 2023-09-12 ASSESSMENT — REFRACTION_CURRENTRX
OD_AXIS: 165
OS_AXIS: 135
OD_SPHERE: -1.00
OD_CYLINDER: +0.25
OS_SPHERE: -0.50
OS_CYLINDER: +0.75
OS_OVR_VA: 20/
OD_VPRISM_DIRECTION: SV
OS_VPRISM_DIRECTION: SV
OD_OVR_VA: 20/

## 2023-09-12 ASSESSMENT — CONFRONTATIONAL VISUAL FIELD TEST (CVF)
OS_FINDINGS: FULL
OD_FINDINGS: FULL

## 2023-09-12 ASSESSMENT — SPHEQUIV_DERIVED
OD_SPHEQUIV: -1.375
OS_SPHEQUIV: -0.75

## 2023-09-12 ASSESSMENT — VISUAL ACUITY
OS_BCVA: 20/25
OD_BCVA: 20/150-1

## 2023-09-14 ENCOUNTER — RX ONLY (RX ONLY)
Age: 83
End: 2023-09-14

## 2023-09-14 ENCOUNTER — OFFICE (OUTPATIENT)
Dept: URBAN - METROPOLITAN AREA CLINIC 30 | Facility: CLINIC | Age: 83
Setting detail: OPHTHALMOLOGY
End: 2023-09-14
Payer: MEDICARE

## 2023-09-14 DIAGNOSIS — H40.1131: ICD-10-CM

## 2023-09-14 DIAGNOSIS — H52.13: ICD-10-CM

## 2023-09-14 DIAGNOSIS — H10.32: ICD-10-CM

## 2023-09-14 PROCEDURE — 92250 FUNDUS PHOTOGRAPHY W/I&R: CPT | Performed by: OPHTHALMOLOGY

## 2023-09-14 PROCEDURE — 92015 DETERMINE REFRACTIVE STATE: CPT | Performed by: OPHTHALMOLOGY

## 2023-09-14 PROCEDURE — 99213 OFFICE O/P EST LOW 20 MIN: CPT | Performed by: OPHTHALMOLOGY

## 2023-09-14 ASSESSMENT — REFRACTION_AUTOREFRACTION
OS_AXIS: 155
OD_CYLINDER: +0.25
OS_CYLINDER: +1.50
OD_AXIS: 045
OD_SPHERE: -1.50
OS_SPHERE: -1.50

## 2023-09-14 ASSESSMENT — REFRACTION_CURRENTRX
OD_CYLINDER: +0.25
OS_SPHERE: -0.50
OD_AXIS: 165
OS_VPRISM_DIRECTION: SV
OD_VPRISM_DIRECTION: SV
OD_OVR_VA: 20/
OS_OVR_VA: 20/
OS_AXIS: 135
OD_SPHERE: -1.00
OS_CYLINDER: +0.75

## 2023-09-14 ASSESSMENT — VISUAL ACUITY
OD_BCVA: 20/150+2
OS_BCVA: 20/25-2

## 2023-09-14 ASSESSMENT — REFRACTION_MANIFEST
OS_SPHERE: -0.50
OS_CYLINDER: +0.75
OD_CYLINDER: +0.25
OD_SPHERE: -1.00
OS_AXIS: 135
OD_AXIS: 165

## 2023-09-14 ASSESSMENT — SPHEQUIV_DERIVED
OS_SPHEQUIV: -0.75
OS_SPHEQUIV: -0.125
OD_SPHEQUIV: -1.375
OD_SPHEQUIV: -0.875

## 2023-09-14 ASSESSMENT — CONFRONTATIONAL VISUAL FIELD TEST (CVF)
OD_FINDINGS: FULL
OS_FINDINGS: FULL

## 2023-09-14 ASSESSMENT — TONOMETRY
OS_IOP_MMHG: 19
OD_IOP_MMHG: 16

## 2024-02-29 ENCOUNTER — OFFICE (OUTPATIENT)
Dept: URBAN - METROPOLITAN AREA CLINIC 30 | Facility: CLINIC | Age: 84
Setting detail: OPHTHALMOLOGY
End: 2024-02-29
Payer: MEDICARE

## 2024-02-29 DIAGNOSIS — H10.32: ICD-10-CM

## 2024-02-29 DIAGNOSIS — H35.372: ICD-10-CM

## 2024-02-29 DIAGNOSIS — H40.1131: ICD-10-CM

## 2024-02-29 DIAGNOSIS — Z96.1: ICD-10-CM

## 2024-02-29 PROCEDURE — 92083 EXTENDED VISUAL FIELD XM: CPT | Performed by: OPHTHALMOLOGY

## 2024-02-29 PROCEDURE — 92014 COMPRE OPH EXAM EST PT 1/>: CPT | Performed by: OPHTHALMOLOGY

## 2024-02-29 PROCEDURE — 92133 CPTRZD OPH DX IMG PST SGM ON: CPT | Performed by: OPHTHALMOLOGY

## 2024-02-29 ASSESSMENT — REFRACTION_MANIFEST
OS_SPHERE: -0.50
OD_CYLINDER: +0.25
OD_SPHERE: -1.00
OS_CYLINDER: +0.75
OD_AXIS: 165
OS_AXIS: 135

## 2024-02-29 ASSESSMENT — REFRACTION_CURRENTRX
OD_OVR_VA: 20/
OS_VPRISM_DIRECTION: SV
OD_SPHERE: -1.00
OD_AXIS: 165
OS_SPHERE: -0.50
OS_CYLINDER: +0.75
OS_AXIS: 135
OD_CYLINDER: +0.25
OS_OVR_VA: 20/
OD_VPRISM_DIRECTION: SV

## 2024-02-29 ASSESSMENT — CONFRONTATIONAL VISUAL FIELD TEST (CVF)
OS_FINDINGS: FULL
OD_FINDINGS: FULL

## 2024-02-29 ASSESSMENT — SPHEQUIV_DERIVED
OD_SPHEQUIV: -0.875
OS_SPHEQUIV: -0.125

## 2024-04-25 ENCOUNTER — OFFICE (OUTPATIENT)
Dept: URBAN - METROPOLITAN AREA CLINIC 30 | Facility: CLINIC | Age: 84
Setting detail: OPHTHALMOLOGY
End: 2024-04-25
Payer: MEDICARE

## 2024-04-25 DIAGNOSIS — Z96.1: ICD-10-CM

## 2024-04-25 DIAGNOSIS — H40.1131: ICD-10-CM

## 2024-04-25 PROCEDURE — 99213 OFFICE O/P EST LOW 20 MIN: CPT | Performed by: OPHTHALMOLOGY

## 2024-10-24 ENCOUNTER — OFFICE (OUTPATIENT)
Facility: LOCATION | Age: 84
Setting detail: OPHTHALMOLOGY
End: 2024-10-24
Payer: MEDICARE

## 2024-10-24 DIAGNOSIS — H35.372: ICD-10-CM

## 2024-10-24 DIAGNOSIS — Z96.1: ICD-10-CM

## 2024-10-24 DIAGNOSIS — H40.1131: ICD-10-CM

## 2024-10-24 PROCEDURE — 99214 OFFICE O/P EST MOD 30 MIN: CPT | Performed by: OPHTHALMOLOGY

## 2024-10-24 PROCEDURE — 92020 GONIOSCOPY: CPT | Performed by: OPHTHALMOLOGY

## 2024-10-24 ASSESSMENT — TONOMETRY
OS_IOP_MMHG: 21
OD_IOP_MMHG: 14

## 2024-10-24 ASSESSMENT — REFRACTION_AUTOREFRACTION
OS_SPHERE: -1.50
OD_AXIS: 045
OS_AXIS: 155
OS_CYLINDER: +1.50
OD_CYLINDER: +0.25
OD_SPHERE: -1.50

## 2024-10-24 ASSESSMENT — REFRACTION_MANIFEST
OD_SPHERE: -1.00
OS_SPHERE: -0.50
OS_CYLINDER: +0.75
OD_AXIS: 165
OS_AXIS: 135
OD_CYLINDER: +0.25

## 2024-10-24 ASSESSMENT — REFRACTION_CURRENTRX
OD_OVR_VA: 20/
OS_OVR_VA: 20/
OD_SPHERE: -1.00
OS_SPHERE: -0.50
OS_CYLINDER: +0.75
OD_CYLINDER: +0.25
OD_VPRISM_DIRECTION: SV
OD_AXIS: 165
OS_VPRISM_DIRECTION: SV
OS_AXIS: 135

## 2024-10-24 ASSESSMENT — VISUAL ACUITY
OS_BCVA: 20/30-2
OD_BCVA: 20/60-1

## 2024-10-24 ASSESSMENT — CONFRONTATIONAL VISUAL FIELD TEST (CVF)
OS_FINDINGS: FULL
OD_FINDINGS: FULL

## 2025-01-30 ENCOUNTER — OFFICE (OUTPATIENT)
Facility: LOCATION | Age: 85
Setting detail: OPHTHALMOLOGY
End: 2025-01-30
Payer: MEDICARE

## 2025-01-30 DIAGNOSIS — H40.1131: ICD-10-CM

## 2025-01-30 DIAGNOSIS — H35.372: ICD-10-CM

## 2025-01-30 DIAGNOSIS — Z96.1: ICD-10-CM

## 2025-01-30 PROCEDURE — 92014 COMPRE OPH EXAM EST PT 1/>: CPT | Performed by: OPHTHALMOLOGY

## 2025-01-30 PROCEDURE — 92133 CPTRZD OPH DX IMG PST SGM ON: CPT | Performed by: OPHTHALMOLOGY

## 2025-01-30 ASSESSMENT — VISUAL ACUITY
OS_BCVA: 20/25-2
OD_BCVA: 20/40+2

## 2025-01-30 ASSESSMENT — REFRACTION_CURRENTRX
OD_OVR_VA: 20/
OD_CYLINDER: +0.25
OD_SPHERE: -1.00
OS_SPHERE: -0.50
OD_VPRISM_DIRECTION: SV
OS_VPRISM_DIRECTION: SV
OD_AXIS: 165
OS_CYLINDER: +0.75
OS_AXIS: 135
OS_OVR_VA: 20/

## 2025-01-30 ASSESSMENT — TONOMETRY
OS_IOP_MMHG: 19
OD_IOP_MMHG: 16

## 2025-01-30 ASSESSMENT — REFRACTION_AUTOREFRACTION
OD_SPHERE: -1.50
OS_AXIS: 155
OD_CYLINDER: +0.25
OS_SPHERE: -1.50
OS_CYLINDER: +1.50
OD_AXIS: 045

## 2025-01-30 ASSESSMENT — CONFRONTATIONAL VISUAL FIELD TEST (CVF)
OS_FINDINGS: SUPERONASAL DEFECT, INFERONASAL DEFECT
OD_FINDINGS: FULL

## 2025-01-30 ASSESSMENT — REFRACTION_MANIFEST
OD_CYLINDER: +0.25
OS_SPHERE: -0.50
OS_CYLINDER: +0.75
OD_SPHERE: -1.00
OS_AXIS: 135
OD_AXIS: 165

## 2025-05-22 ENCOUNTER — OFFICE (OUTPATIENT)
Facility: LOCATION | Age: 85
Setting detail: OPHTHALMOLOGY
End: 2025-05-22
Payer: COMMERCIAL

## 2025-05-22 DIAGNOSIS — H16.223: ICD-10-CM

## 2025-05-22 DIAGNOSIS — H40.1131: ICD-10-CM

## 2025-05-22 DIAGNOSIS — Z96.1: ICD-10-CM

## 2025-05-22 PROCEDURE — 92083 EXTENDED VISUAL FIELD XM: CPT | Performed by: OPHTHALMOLOGY

## 2025-05-22 PROCEDURE — 99213 OFFICE O/P EST LOW 20 MIN: CPT | Performed by: OPHTHALMOLOGY

## 2025-05-22 ASSESSMENT — REFRACTION_CURRENTRX
OS_VPRISM_DIRECTION: SV
OD_CYLINDER: +0.25
OD_VPRISM_DIRECTION: SV
OD_OVR_VA: 20/
OS_OVR_VA: 20/
OS_SPHERE: -0.50
OS_CYLINDER: +0.75
OD_AXIS: 165
OS_AXIS: 135
OD_SPHERE: -1.00

## 2025-05-22 ASSESSMENT — REFRACTION_AUTOREFRACTION
OD_AXIS: 045
OS_AXIS: 155
OD_CYLINDER: +0.25
OS_CYLINDER: +1.50
OS_SPHERE: -1.50
OD_SPHERE: -1.50

## 2025-05-22 ASSESSMENT — REFRACTION_MANIFEST
OS_SPHERE: -0.50
OD_SPHERE: -1.00
OD_CYLINDER: +0.25
OS_AXIS: 135
OS_CYLINDER: +0.75
OD_AXIS: 165

## 2025-05-22 ASSESSMENT — CONFRONTATIONAL VISUAL FIELD TEST (CVF)
OD_FINDINGS: FULL
OS_FINDINGS: FULL

## 2025-05-22 ASSESSMENT — SUPERFICIAL PUNCTATE KERATITIS (SPK)
OD_SPK: T
OS_SPK: T

## 2025-05-22 ASSESSMENT — VISUAL ACUITY
OS_BCVA: 20/20-2
OD_BCVA: 20/60-2

## 2025-05-22 ASSESSMENT — TONOMETRY
OS_IOP_MMHG: 11
OD_IOP_MMHG: 13